# Patient Record
Sex: FEMALE | Race: WHITE | Employment: OTHER | URBAN - METROPOLITAN AREA
[De-identification: names, ages, dates, MRNs, and addresses within clinical notes are randomized per-mention and may not be internally consistent; named-entity substitution may affect disease eponyms.]

---

## 2018-10-04 PROBLEM — E55.9 HYPOVITAMINOSIS D: Status: ACTIVE | Noted: 2018-10-04

## 2021-12-07 ENCOUNTER — APPOINTMENT (OUTPATIENT)
Dept: GENERAL RADIOLOGY | Age: 86
DRG: 522 | End: 2021-12-07
Payer: COMMERCIAL

## 2021-12-07 ENCOUNTER — HOSPITAL ENCOUNTER (INPATIENT)
Age: 86
LOS: 6 days | Discharge: NURSING, REHAB OR LONG-TERM CARE | DRG: 522 | End: 2021-12-13
Attending: EMERGENCY MEDICINE | Admitting: SURGERY
Payer: COMMERCIAL

## 2021-12-07 DIAGNOSIS — S72.001A CLOSED FRACTURE OF NECK OF RIGHT FEMUR, INITIAL ENCOUNTER (HCC): Primary | ICD-10-CM
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
## 2021-12-07 PROBLEM — S72.011A CLOSED SUBCAPITAL FRACTURE OF FEMUR, RIGHT, INITIAL ENCOUNTER (HCC): Status: ACTIVE | Noted: 2021-12-07

## 2021-12-07 LAB
ABSOLUTE EOS #: 0.13 K/UL (ref 0–0.4)
ABSOLUTE IMMATURE GRANULOCYTE: 0 K/UL (ref 0–0.3)
ABSOLUTE LYMPH #: 0.58 K/UL (ref 1–4.8)
ABSOLUTE MONO #: 0.64 K/UL (ref 0.1–0.8)
ALBUMIN SERPL-MCNC: 3.8 G/DL (ref 3.5–5.2)
ALBUMIN/GLOBULIN RATIO: 0.9 (ref 1–2.5)
ALLEN TEST: ABNORMAL
ALP BLD-CCNC: 170 U/L (ref 35–104)
ALT SERPL-CCNC: 24 U/L (ref 5–33)
ANION GAP SERPL CALCULATED.3IONS-SCNC: 17 MMOL/L (ref 9–17)
ANION GAP SERPL CALCULATED.3IONS-SCNC: ABNORMAL MMOL/L
AST SERPL-CCNC: 65 U/L
BASOPHILS # BLD: 1 % (ref 0–2)
BASOPHILS ABSOLUTE: 0.06 K/UL (ref 0–0.2)
BILIRUB SERPL-MCNC: 1.15 MG/DL (ref 0.3–1.2)
BILIRUBIN DIRECT: 0.4 MG/DL
BILIRUBIN, INDIRECT: 0.75 MG/DL (ref 0–1)
BLOOD BANK SPECIMEN: ABNORMAL
BUN BLDV-MCNC: 15 MG/DL (ref 8–23)
BUN BLDV-MCNC: ABNORMAL MG/DL
BUN/CREAT BLD: ABNORMAL (ref 9–20)
CALCIUM SERPL-MCNC: 9.7 MG/DL (ref 8.6–10.4)
CARBOXYHEMOGLOBIN: 2.1 % (ref 0–5)
CHLORIDE BLD-SCNC: 99 MMOL/L (ref 98–107)
CHLORIDE BLD-SCNC: ABNORMAL MMOL/L
CO2: 19 MMOL/L (ref 20–31)
CO2: ABNORMAL MMOL/L
CREAT SERPL-MCNC: 0.66 MG/DL (ref 0.5–0.9)
CREAT SERPL-MCNC: ABNORMAL MG/DL
DIFFERENTIAL TYPE: ABNORMAL
EOSINOPHILS RELATIVE PERCENT: 2 % (ref 1–4)
ETHANOL PERCENT: <0.01 %
ETHANOL: <10 MG/DL
FIO2: ABNORMAL
GFR AFRICAN AMERICAN: >60 ML/MIN
GFR AFRICAN AMERICAN: ABNORMAL ML/MIN
GFR NON-AFRICAN AMERICAN: >60 ML/MIN
GFR NON-AFRICAN AMERICAN: ABNORMAL ML/MIN
GFR SERPL CREATININE-BSD FRML MDRD: ABNORMAL ML/MIN/{1.73_M2}
GLOBULIN: ABNORMAL G/DL (ref 1.5–3.8)
GLUCOSE BLD-MCNC: 231 MG/DL (ref 70–99)
GLUCOSE BLD-MCNC: ABNORMAL MG/DL
HCG QUALITATIVE: NEGATIVE
HCO3 VENOUS: 22.1 MMOL/L (ref 24–30)
HCT VFR BLD CALC: 38.6 % (ref 36.3–47.1)
HCT VFR BLD CALC: ABNORMAL %
HEMOGLOBIN: 13.1 G/DL (ref 11.9–15.1)
HEMOGLOBIN: ABNORMAL G/DL
IMMATURE GRANULOCYTES: 0 %
INR BLD: 1.1
LYMPHOCYTES # BLD: 9 % (ref 24–44)
MCH RBC QN AUTO: 33.3 PG (ref 25.2–33.5)
MCH RBC QN AUTO: ABNORMAL PG
MCHC RBC AUTO-ENTMCNC: 33.9 G/DL (ref 28.4–34.8)
MCHC RBC AUTO-ENTMCNC: ABNORMAL G/DL
MCV RBC AUTO: 98.2 FL (ref 82.6–102.9)
MCV RBC AUTO: ABNORMAL FL
METHEMOGLOBIN: ABNORMAL % (ref 0–1.5)
MODE: ABNORMAL
MONOCYTES # BLD: 10 % (ref 1–7)
MORPHOLOGY: NORMAL
NEGATIVE BASE EXCESS, VEN: 2.4 MMOL/L (ref 0–2)
NOTIFICATION TIME: ABNORMAL
NOTIFICATION: ABNORMAL
NRBC AUTOMATED: 0 PER 100 WBC
NRBC AUTOMATED: ABNORMAL PER 100 WBC
O2 DEVICE/FLOW/%: ABNORMAL
O2 SAT, VEN: 98.4 % (ref 60–85)
OXYHEMOGLOBIN: ABNORMAL % (ref 95–98)
PARTIAL THROMBOPLASTIN TIME: 27.1 SEC (ref 20.5–30.5)
PATIENT TEMP: 37
PCO2, VEN, TEMP ADJ: ABNORMAL MMHG (ref 39–55)
PCO2, VEN: 39.5 (ref 39–55)
PDW BLD-RTO: 13.9 % (ref 11.8–14.4)
PDW BLD-RTO: ABNORMAL %
PEEP/CPAP: ABNORMAL
PH VENOUS: 7.37 (ref 7.32–7.42)
PH, VEN, TEMP ADJ: ABNORMAL (ref 7.32–7.42)
PLATELET # BLD: 191 K/UL (ref 138–453)
PLATELET # BLD: ABNORMAL K/UL
PLATELET ESTIMATE: ABNORMAL
PMV BLD AUTO: 10.7 FL (ref 8.1–13.5)
PMV BLD AUTO: ABNORMAL FL
PO2, VEN, TEMP ADJ: ABNORMAL MMHG (ref 30–50)
PO2, VEN: 138 (ref 30–50)
POSITIVE BASE EXCESS, VEN: ABNORMAL MMOL/L (ref 0–2)
POTASSIUM SERPL-SCNC: 3.7 MMOL/L (ref 3.7–5.3)
POTASSIUM SERPL-SCNC: ABNORMAL MMOL/L
PROTHROMBIN TIME: 11.7 SEC (ref 9.1–12.3)
PSV: ABNORMAL
PT. POSITION: ABNORMAL
RBC # BLD: 3.93 M/UL (ref 3.95–5.11)
RBC # BLD: ABNORMAL 10*6/UL
RBC # BLD: ABNORMAL M/UL
RESPIRATORY RATE: ABNORMAL
SAMPLE SITE: ABNORMAL
SARS-COV-2, RAPID: NOT DETECTED
SEG NEUTROPHILS: 78 % (ref 36–66)
SEGMENTED NEUTROPHILS ABSOLUTE COUNT: 4.99 K/UL (ref 1.8–7.7)
SET RATE: ABNORMAL
SODIUM BLD-SCNC: 135 MMOL/L (ref 135–144)
SODIUM BLD-SCNC: ABNORMAL MMOL/L
SPECIMEN DESCRIPTION: NORMAL
TEXT FOR RESPIRATORY: ABNORMAL
TOTAL HB: ABNORMAL G/DL (ref 12–16)
TOTAL PROTEIN: 8.2 G/DL (ref 6.4–8.3)
TOTAL RATE: ABNORMAL
VT: ABNORMAL
WBC # BLD: 6.4 K/UL (ref 3.5–11.3)
WBC # BLD: ABNORMAL 10*3/UL
WBC # BLD: ABNORMAL K/UL

## 2021-12-07 PROCEDURE — 80048 BASIC METABOLIC PNL TOTAL CA: CPT

## 2021-12-07 PROCEDURE — 84520 ASSAY OF UREA NITROGEN: CPT

## 2021-12-07 PROCEDURE — 85610 PROTHROMBIN TIME: CPT

## 2021-12-07 PROCEDURE — 96375 TX/PRO/DX INJ NEW DRUG ADDON: CPT

## 2021-12-07 PROCEDURE — 84100 ASSAY OF PHOSPHORUS: CPT

## 2021-12-07 PROCEDURE — 87635 SARS-COV-2 COVID-19 AMP PRB: CPT

## 2021-12-07 PROCEDURE — 85027 COMPLETE CBC AUTOMATED: CPT

## 2021-12-07 PROCEDURE — 93005 ELECTROCARDIOGRAM TRACING: CPT | Performed by: STUDENT IN AN ORGANIZED HEALTH CARE EDUCATION/TRAINING PROGRAM

## 2021-12-07 PROCEDURE — 73502 X-RAY EXAM HIP UNI 2-3 VIEWS: CPT

## 2021-12-07 PROCEDURE — 96374 THER/PROPH/DIAG INJ IV PUSH: CPT

## 2021-12-07 PROCEDURE — 93005 ELECTROCARDIOGRAM TRACING: CPT | Performed by: PEDIATRICS

## 2021-12-07 PROCEDURE — 73562 X-RAY EXAM OF KNEE 3: CPT

## 2021-12-07 PROCEDURE — G0480 DRUG TEST DEF 1-7 CLASSES: HCPCS

## 2021-12-07 PROCEDURE — 99285 EMERGENCY DEPT VISIT HI MDM: CPT

## 2021-12-07 PROCEDURE — 6360000002 HC RX W HCPCS: Performed by: PEDIATRICS

## 2021-12-07 PROCEDURE — 86850 RBC ANTIBODY SCREEN: CPT

## 2021-12-07 PROCEDURE — 82565 ASSAY OF CREATININE: CPT

## 2021-12-07 PROCEDURE — 85730 THROMBOPLASTIN TIME PARTIAL: CPT

## 2021-12-07 PROCEDURE — 84703 CHORIONIC GONADOTROPIN ASSAY: CPT

## 2021-12-07 PROCEDURE — 71045 X-RAY EXAM CHEST 1 VIEW: CPT

## 2021-12-07 PROCEDURE — 80076 HEPATIC FUNCTION PANEL: CPT

## 2021-12-07 PROCEDURE — 83735 ASSAY OF MAGNESIUM: CPT

## 2021-12-07 PROCEDURE — 86920 COMPATIBILITY TEST SPIN: CPT

## 2021-12-07 PROCEDURE — 86900 BLOOD TYPING SEROLOGIC ABO: CPT

## 2021-12-07 PROCEDURE — 73552 X-RAY EXAM OF FEMUR 2/>: CPT

## 2021-12-07 PROCEDURE — 82805 BLOOD GASES W/O2 SATURATION: CPT

## 2021-12-07 PROCEDURE — 1200000000 HC SEMI PRIVATE

## 2021-12-07 PROCEDURE — 80051 ELECTROLYTE PANEL: CPT

## 2021-12-07 PROCEDURE — 82947 ASSAY GLUCOSE BLOOD QUANT: CPT

## 2021-12-07 PROCEDURE — 86901 BLOOD TYPING SEROLOGIC RH(D): CPT

## 2021-12-07 PROCEDURE — 99221 1ST HOSP IP/OBS SF/LOW 40: CPT | Performed by: ORTHOPAEDIC SURGERY

## 2021-12-07 PROCEDURE — 85025 COMPLETE CBC W/AUTO DIFF WBC: CPT

## 2021-12-07 RX ORDER — SODIUM CHLORIDE 9 MG/ML
25 INJECTION, SOLUTION INTRAVENOUS PRN
Status: DISCONTINUED | OUTPATIENT
Start: 2021-12-07 | End: 2021-12-08

## 2021-12-07 RX ORDER — ONDANSETRON 2 MG/ML
4 INJECTION INTRAMUSCULAR; INTRAVENOUS EVERY 6 HOURS PRN
Status: DISCONTINUED | OUTPATIENT
Start: 2021-12-07 | End: 2021-12-10

## 2021-12-07 RX ORDER — SODIUM PHOSPHATE, DIBASIC AND SODIUM PHOSPHATE, MONOBASIC 7; 19 G/133ML; G/133ML
1 ENEMA RECTAL DAILY PRN
Status: DISCONTINUED | OUTPATIENT
Start: 2021-12-07 | End: 2021-12-08

## 2021-12-07 RX ORDER — ACETAMINOPHEN 500 MG
1000 TABLET ORAL EVERY 8 HOURS SCHEDULED
Status: DISCONTINUED | OUTPATIENT
Start: 2021-12-07 | End: 2021-12-13 | Stop reason: HOSPADM

## 2021-12-07 RX ORDER — SODIUM CHLORIDE 0.9 % (FLUSH) 0.9 %
5-40 SYRINGE (ML) INJECTION EVERY 12 HOURS SCHEDULED
Status: DISCONTINUED | OUTPATIENT
Start: 2021-12-07 | End: 2021-12-08

## 2021-12-07 RX ORDER — METHOCARBAMOL 500 MG/1
750 TABLET, FILM COATED ORAL EVERY 6 HOURS
Status: DISCONTINUED | OUTPATIENT
Start: 2021-12-07 | End: 2021-12-09

## 2021-12-07 RX ORDER — FAMOTIDINE 20 MG/1
20 TABLET, FILM COATED ORAL DAILY
Status: DISCONTINUED | OUTPATIENT
Start: 2021-12-08 | End: 2021-12-08

## 2021-12-07 RX ORDER — MORPHINE SULFATE 4 MG/ML
4 INJECTION, SOLUTION INTRAMUSCULAR; INTRAVENOUS
Status: DISCONTINUED | OUTPATIENT
Start: 2021-12-07 | End: 2021-12-08

## 2021-12-07 RX ORDER — DEXTROSE MONOHYDRATE 25 G/50ML
12.5 INJECTION, SOLUTION INTRAVENOUS PRN
Status: DISCONTINUED | OUTPATIENT
Start: 2021-12-07 | End: 2021-12-10

## 2021-12-07 RX ORDER — SODIUM CHLORIDE 0.9 % (FLUSH) 0.9 %
5-40 SYRINGE (ML) INJECTION PRN
Status: DISCONTINUED | OUTPATIENT
Start: 2021-12-07 | End: 2021-12-13 | Stop reason: HOSPADM

## 2021-12-07 RX ORDER — POLYETHYLENE GLYCOL 3350 17 G/17G
17 POWDER, FOR SOLUTION ORAL DAILY
Status: DISCONTINUED | OUTPATIENT
Start: 2021-12-08 | End: 2021-12-07 | Stop reason: SDUPTHER

## 2021-12-07 RX ORDER — AMLODIPINE BESYLATE 10 MG/1
10 TABLET ORAL DAILY
Status: DISCONTINUED | OUTPATIENT
Start: 2021-12-08 | End: 2021-12-13 | Stop reason: HOSPADM

## 2021-12-07 RX ORDER — NICOTINE POLACRILEX 4 MG
15 LOZENGE BUCCAL PRN
Status: DISCONTINUED | OUTPATIENT
Start: 2021-12-07 | End: 2021-12-08

## 2021-12-07 RX ORDER — ONDANSETRON 4 MG/1
4 TABLET, ORALLY DISINTEGRATING ORAL EVERY 8 HOURS PRN
Status: DISCONTINUED | OUTPATIENT
Start: 2021-12-07 | End: 2021-12-10

## 2021-12-07 RX ORDER — METOPROLOL TARTRATE 50 MG/1
50 TABLET, FILM COATED ORAL DAILY
Status: DISCONTINUED | OUTPATIENT
Start: 2021-12-08 | End: 2021-12-13 | Stop reason: HOSPADM

## 2021-12-07 RX ORDER — DEXTROSE, SODIUM CHLORIDE, AND POTASSIUM CHLORIDE 5; .45; .15 G/100ML; G/100ML; G/100ML
INJECTION INTRAVENOUS CONTINUOUS
Status: DISPENSED | OUTPATIENT
Start: 2021-12-08 | End: 2021-12-09

## 2021-12-07 RX ORDER — BISACODYL 10 MG
10 SUPPOSITORY, RECTAL RECTAL DAILY
Status: DISCONTINUED | OUTPATIENT
Start: 2021-12-08 | End: 2021-12-12

## 2021-12-07 RX ORDER — DEXTROSE MONOHYDRATE 50 MG/ML
100 INJECTION, SOLUTION INTRAVENOUS PRN
Status: DISCONTINUED | OUTPATIENT
Start: 2021-12-07 | End: 2021-12-10

## 2021-12-07 RX ORDER — SENNA AND DOCUSATE SODIUM 50; 8.6 MG/1; MG/1
1 TABLET, FILM COATED ORAL 2 TIMES DAILY
Status: DISCONTINUED | OUTPATIENT
Start: 2021-12-07 | End: 2021-12-13 | Stop reason: HOSPADM

## 2021-12-07 RX ORDER — GABAPENTIN 300 MG/1
300 CAPSULE ORAL EVERY 8 HOURS
Status: DISCONTINUED | OUTPATIENT
Start: 2021-12-07 | End: 2021-12-10

## 2021-12-07 RX ORDER — HYDROCHLOROTHIAZIDE 25 MG/1
25 TABLET ORAL DAILY
Status: DISCONTINUED | OUTPATIENT
Start: 2021-12-08 | End: 2021-12-13 | Stop reason: HOSPADM

## 2021-12-07 RX ORDER — OXYCODONE HYDROCHLORIDE 5 MG/1
2.5 TABLET ORAL EVERY 6 HOURS PRN
Status: DISCONTINUED | OUTPATIENT
Start: 2021-12-07 | End: 2021-12-10

## 2021-12-07 RX ORDER — FENTANYL CITRATE 50 UG/ML
50 INJECTION, SOLUTION INTRAMUSCULAR; INTRAVENOUS
Status: DISCONTINUED | OUTPATIENT
Start: 2021-12-07 | End: 2021-12-08

## 2021-12-07 RX ORDER — SODIUM CHLORIDE 0.9 % (FLUSH) 0.9 %
5-40 SYRINGE (ML) INJECTION PRN
Status: DISCONTINUED | OUTPATIENT
Start: 2021-12-07 | End: 2021-12-10

## 2021-12-07 RX ORDER — ONDANSETRON 4 MG/1
4 TABLET, ORALLY DISINTEGRATING ORAL EVERY 8 HOURS PRN
Status: DISCONTINUED | OUTPATIENT
Start: 2021-12-07 | End: 2021-12-07 | Stop reason: SDUPTHER

## 2021-12-07 RX ORDER — MORPHINE SULFATE 4 MG/ML
4 INJECTION, SOLUTION INTRAMUSCULAR; INTRAVENOUS ONCE
Status: COMPLETED | OUTPATIENT
Start: 2021-12-07 | End: 2021-12-07

## 2021-12-07 RX ORDER — ONDANSETRON 2 MG/ML
4 INJECTION INTRAMUSCULAR; INTRAVENOUS EVERY 6 HOURS PRN
Status: DISCONTINUED | OUTPATIENT
Start: 2021-12-07 | End: 2021-12-07 | Stop reason: SDUPTHER

## 2021-12-07 RX ORDER — POLYETHYLENE GLYCOL 3350 17 G/17G
17 POWDER, FOR SOLUTION ORAL DAILY
Status: DISCONTINUED | OUTPATIENT
Start: 2021-12-08 | End: 2021-12-13 | Stop reason: HOSPADM

## 2021-12-07 RX ADMIN — MORPHINE SULFATE 4 MG: 4 INJECTION INTRAVENOUS at 16:34

## 2021-12-07 RX ADMIN — FENTANYL CITRATE 50 MCG: 50 INJECTION, SOLUTION INTRAMUSCULAR; INTRAVENOUS at 22:28

## 2021-12-07 RX ADMIN — FENTANYL CITRATE 50 MCG: 50 INJECTION, SOLUTION INTRAMUSCULAR; INTRAVENOUS at 20:32

## 2021-12-07 RX ADMIN — FENTANYL CITRATE 50 MCG: 50 INJECTION, SOLUTION INTRAMUSCULAR; INTRAVENOUS at 16:06

## 2021-12-07 ASSESSMENT — PAIN SCALES - GENERAL
PAINLEVEL_OUTOF10: 10
PAINLEVEL_OUTOF10: 8
PAINLEVEL_OUTOF10: 10
PAINLEVEL_OUTOF10: 7
PAINLEVEL_OUTOF10: 10

## 2021-12-07 ASSESSMENT — ENCOUNTER SYMPTOMS
SORE THROAT: 0
EYE DISCHARGE: 0
CHEST TIGHTNESS: 0
EYE REDNESS: 0
WHEEZING: 0
EYES NEGATIVE: 1
COUGH: 0
SHORTNESS OF BREATH: 0
VOMITING: 0
ABDOMINAL PAIN: 0
CHOKING: 0
CONSTIPATION: 0
DIARRHEA: 0
RESPIRATORY NEGATIVE: 1
ABDOMINAL DISTENTION: 0
RHINORRHEA: 0

## 2021-12-07 ASSESSMENT — PAIN DESCRIPTION - PAIN TYPE: TYPE: ACUTE PAIN

## 2021-12-07 ASSESSMENT — PAIN DESCRIPTION - LOCATION: LOCATION: HIP

## 2021-12-07 ASSESSMENT — PAIN DESCRIPTION - ORIENTATION: ORIENTATION: RIGHT

## 2021-12-07 NOTE — ED PROVIDER NOTES
[address] [facility]     Emergency Department     Faculty Attestation    I performed a history and physical examination of the patient and discussed management with the resident. I reviewed the residents note and agree with the documented findings including all diagnostic interpretations and plan of care. Any areas of disagreement are noted on the chart. I was personally present for the key portions of any procedures. I have documented in the chart those procedures where I was not present during the key portions. I have reviewed the emergency nurses triage note. I agree with the chief complaint, past medical history, past surgical history, allergies, medications, social and family history as documented unless otherwise noted below. Documentation of the HPI, Physical Exam and Medical Decision Making performed by scribes is based on my personal performance of the HPI, PE and MDM. For Physician Assistant/ Nurse Practitioner cases/documentation I have personally evaluated this patient and have completed at least one if not all key elements of the E/M (history, physical exam, and MDM). Additional findings are as noted. This patient was evaluated in the Emergency Department for symptoms described in the history of present illness. He/she was evaluated in the context of the global COVID-19 pandemic, which necessitated consideration that the patient might be at risk for infection with the SARS-CoV-2 virus that causes COVID-19. Institutional protocols and algorithms that pertain to the evaluation of patients at risk for COVID-19 are in a state of rapid change based on information released by regulatory bodies including the CDC and federal and state organizations. These policies and algorithms were followed during the patient's care in the ED. Primary Care Physician: Parrish Thrasher MD    History:  This is a 80 y.o. female who presents to the Emergency Department with

## 2021-12-07 NOTE — ED NOTES
Bed: 19  Expected date: 12/7/21  Expected time: 3:35 PM  Means of arrival: EMS  Comments:  Lawson Fong RN  12/07/21 [id number]

## 2021-12-07 NOTE — ED NOTES
Report received from [facility]. Pt resting in bed. Family at bedside.  Will continue to monitor       Kati Jo RN  12/07/21 0542

## 2021-12-07 NOTE — ED PROVIDER NOTES
[facility]  Emergency Department Encounter  EmergencyMedicine Resident     Pt Paulette Vora  MRN: [id number]  Elena 11/9/1929  Date of evaluation: 12/7/21  PCP:  Parrish Thrasher MD    CHIEF COMPLAINT       Chief Complaint   Patient presents with    Fall     possible hip fx        HISTORY OF PRESENT ILLNESS  (Location/Symptom, Timing/Onset, Context/Setting, Quality, Duration, Modifying Factors, Severity.)      Shana Lopez is a 80 y.o. female otherwise healthy, has no history of hypertension and prediabetes who lives in assisted living center and cares for herself primarily, who presents via squad after fall. Patient states she has a cane to ambulate and was on linoleum tile floor bent down to pick something up and her cane slipped forward and she fell from a squatted position on her left hip. Patient states she was laying there approximately 30 minutes before pushing the button for help unable to get herself up. No anticoagulation. Alert and oriented x4 denies chest pain shortness of breath nausea vomiting abdominal pain or diarrhea. She states that she has extreme pain in her right hip only, and it is reproduced with any mobility of her foot toes or knee. Was provided 100 MCG fentanyl for squad, arrived with a 20-gauge IV in right AC. Medication list from assisted living and code status as well as POA reviewed. No family at bedside at this time. Patient is a DNR-CCA. Patient endorses having a blood transfusion within the past 6 months and on a medication for anemia. Patient is on an ARB, CCB and beta-blocker for hypertension, she is on Metformin for diabetes. Activities of daily living, patient does drive herself to grocery store and cook herself food.      PAST MEDICAL / SURGICAL / SOCIAL / FAMILY HISTORY      has a past medical history of Anxiety, Cancer ([address] Chronic kidney disease, DM (diabetes mellitus) ([facility] [address] GI bleed, History of blood transfusion, Hypertension, Liver disease, and Osteoarthritis. has a past surgical history that includes Breast lumpectomy; lymph node dissection; Colonoscopy; Endoscopy, colon, diagnostic; hip surgery (Right, 12/08/2021); and hip surgery (Right, 12/8/2021). Social History     Socioeconomic History    Marital status:      Spouse name: Not on file    Number of children: Not on file    Years of education: Not on file    Highest education level: Not on file   Occupational History    Not on file   Tobacco Use    Smoking status: Never Smoker    Smokeless tobacco: Never Used   Substance and Sexual Activity    Alcohol use: No     Alcohol/week: 0.0 standard drinks    Drug use: No    Sexual activity: Not on file   Other Topics Concern    Not on file   Social History Narrative    Not on file     Social Determinants of Health     Financial Resource Strain:     Difficulty of Paying Living Expenses: Not on file   Food Insecurity:     Worried About Running Out of Food in the Last Year: Not on file    Joi of Food in the Last Year: Not on file   Transportation Needs:     Lack of Transportation (Medical): Not on file    Lack of Transportation (Non-Medical):  Not on file   Physical Activity:     Days of Exercise per Week: Not on file    Minutes of Exercise per Session: Not on file   Stress:     Feeling of Stress : Not on file   Social Connections:     Frequency of Communication with Friends and Family: Not on file    Frequency of Social Gatherings with Friends and Family: Not on file    Attends Yarsani Services: Not on file    Active Member of Clubs or Organizations: Not on file    Attends Club or Organization Meetings: Not on file    Marital Status: Not on file   Intimate Partner Violence:     Fear of Current or Ex-Partner: Not on file    Emotionally Abused: Not on file    Physically Abused: Not on file    Sexually Abused: Not on file   Housing Stability:     Unable to Pay for Housing in the Last Year: Not on file    Number of Places Lived in the Last Year: Not on file    Unstable Housing in the Last Year: Not on file       Family History   Family history unknown: Yes       Allergies:  Latex, Atorvastatin, Keflex [cephalexin], Penicillins, and Rosuvastatin    Home Medications:  Prior to Admission medications    Medication Sig Start Date End Date Taking? Authorizing Provider   Cholecalciferol 50 MCG (2000 UT) TABS Take 50 mcg by mouth 2 times daily   Yes Historical Provider, MD   ferrous sulfate (FE TABS 325) 325 (65 Fe) MG EC tablet Take 325 mg by mouth 2 times daily 5/27/21  Yes Historical Provider, MD   levothyroxine (SYNTHROID) 25 MCG tablet Take 25 mcg by mouth daily 7/1/21  Yes Historical Provider, MD   loperamide (IMODIUM A-D) 2 MG tablet Take 2 mg by mouth 4 times daily as needed 9/9/21  Yes Historical Provider, MD   pantoprazole (PROTONIX) 40 MG tablet Take 40 mg by mouth daily   Yes Historical Provider, MD   potassium chloride (KLOR-CON M) 10 MEQ extended release tablet Take 10 mEq by mouth daily 9/9/21  Yes Historical Provider, MD   propranolol (INDERAL) 10 MG tablet Take 10 mg by mouth 2 times daily 5/27/21  Yes Historical Provider, MD   valsartan (DIOVAN) 320 MG tablet Take 320 mg by mouth daily 10/7/21  Yes Historical Provider, MD   acetaminophen-codeine (TYLENOL #3) 300-30 MG per tablet Take 1 tablet by mouth every 4 hours as needed for Pain   Yes Historical Provider, MD   amLODIPine (NORVASC) 10 MG tablet Take 10 mg by mouth daily   Yes Historical Provider, MD   hydrochlorothiazide (HYDRODIURIL) 25 MG tablet Take 25 mg by mouth daily   Yes Historical Provider, MD   metFORMIN (GLUCOPHAGE) 500 MG tablet Take 500 mg by mouth 2 times daily (with meals)   Yes Historical Provider, MD   ALPRAZolam (XANAX) 0.5 MG tablet Take 0.5 mg by mouth nightly as needed for Sleep.      Historical Provider, MD   metoprolol (LOPRESSOR) 50 MG tablet Take 50 mg by mouth daily    Historical Provider, MD REVIEW OF SYSTEMS    (2-9 systems for level 4, 10 or more for level 5)      Review of Systems   Constitutional: Negative for activity change, appetite change, chills and fever. HENT: Negative for congestion, ear pain, rhinorrhea and sore throat. Eyes: Negative for discharge and redness. Respiratory: Negative for cough, choking, chest tightness, shortness of breath and wheezing. Cardiovascular: Negative for chest pain and leg swelling. Gastrointestinal: Negative for constipation, diarrhea and vomiting. Endocrine: Negative for polydipsia and polyuria. Genitourinary: Negative for decreased urine volume, difficulty urinating, dysuria and menstrual problem. Musculoskeletal: Positive for gait problem. Right hip pain   Skin: Negative for rash. Allergic/Immunologic: Negative for food allergies. Neurological: Negative for dizziness, seizures, speech difficulty and headaches. Hematological: Negative for adenopathy. Does not bruise/bleed easily. No anticoagulation   Psychiatric/Behavioral: Negative for behavioral problems and confusion. PHYSICAL EXAM   (up to 7 for level 4, 8 or more for level 5)      INITIAL VITALS:   BP (!) 142/53   Pulse 67   Temp 98.6 °F (37 °C) (Oral)   Resp 20   Ht 5' 4\" (1.626 m)   Wt 186 lb (84.4 kg)   SpO2 94%   BMI 31.93 kg/m²     Physical Exam  Vitals and nursing note reviewed. Exam conducted with a chaperone present. Constitutional:       General: She is not in acute distress. Appearance: Normal appearance. She is well-developed and normal weight. She is not ill-appearing, toxic-appearing or diaphoretic. Comments: BP (!) 164/69   Pulse 77   Temp 97.4 °F (36.3 °C) (Axillary)   Resp 16   Ht 5' 4\" (1.626 m)   Wt 151 lb (68.5 kg)   SpO2 95%   BMI 25.92 kg/m²      HENT:      Head: Normocephalic and atraumatic. Right Ear: Tympanic membrane and external ear normal. There is no impacted cerumen.       Left Ear: Tympanic membrane and external ear normal. There is no impacted cerumen. Nose: Nose normal. No congestion. Mouth/Throat:      Mouth: Mucous membranes are dry. Pharynx: No oropharyngeal exudate. Eyes:      General: No scleral icterus. Right eye: No discharge. Left eye: No discharge. Conjunctiva/sclera: Conjunctivae normal.      Pupils: Pupils are equal, round, and reactive to light. Cardiovascular:      Rate and Rhythm: Normal rate and regular rhythm. Pulses: Normal pulses. Heart sounds: Normal heart sounds. Pulmonary:      Effort: Pulmonary effort is normal. No respiratory distress. Breath sounds: Normal breath sounds. No wheezing. Abdominal:      General: Abdomen is flat. Tenderness: There is no abdominal tenderness. There is no guarding. Musculoskeletal:         General: No swelling. Normal range of motion. Cervical back: Normal range of motion and neck supple. No rigidity or tenderness. Right lower leg: No edema. Left lower leg: No edema. Comments: Exquisitely tender to palpation of the right hip. She has an externally rotated right lower extremity. Some lateral medial palpation of of right knee with reproduction of pain. Her right knee is slightly more swollen than her left. Patient able to wiggle toes of her right lower extremity. Her right lower extremity is externally rotated and slightly shortened compared to her left, best visible at her heels. Reproduction of hip pain with any movement of right lower extremity. She does have a ecchymotic site on the dorsal aspect of her right foot, consistent with patient dropping an  item on her foot a few days prior to arrival.   Lymphadenopathy:      Cervical: No cervical adenopathy. Skin:     General: Skin is warm. Capillary Refill: Capillary refill takes less than 2 seconds. Coloration: Skin is not jaundiced or pale. Findings: Bruising present. No lesion or rash. Neurological:      General: No focal deficit present. Mental Status: She is alert and oriented to person, place, and time. Mental status is at baseline. Motor: No weakness. Comments: Neurovascularly intact patient is with normal sensation and able to follow commands    Psychiatric:         Mood and Affect: Mood normal.         DIFFERENTIAL  DIAGNOSIS     PLAN (LABS / IMAGING / EKG):  Orders Placed This Encounter   Procedures    COVID-19, Rapid    XR HIP RIGHT (2-3 VIEWS)    XR KNEE RIGHT (3 VIEWS)    XR FEMUR RIGHT (MIN 2 VIEWS)    XR CHEST PORTABLE    FLUORO FOR SURGICAL PROCEDURES    XR HIP 2-3 VW W PELVIS RIGHT    TRAUMA PANEL    CBC WITH AUTO DIFFERENTIAL    Basic Metabolic Panel w/ Reflex to MG    HEPATIC FUNCTION PANEL    Basic Metabolic Panel w/ Reflex to MG    Magnesium    Phosphorus    AMMONIA    Hemoglobin A1c    APTT    Protime-INR    Magnesium    Phosphorus    Vitamin D 25 Hydroxy    HEMOGLOBIN AND HEMATOCRIT, BLOOD    Basic Metabolic Panel w/ Reflex to MG    CBC WITH AUTO DIFFERENTIAL    Basic Metabolic Panel w/ Reflex to MG    SODIUM    ADULT DIET; Regular; 4 carb choices (60 gm/meal);  High Fiber; 1000 ml    Vital signs per unit routine    Daily weights    Intake and output    Monitor for signs/symptoms of urinary retention    Encourage deep breathing and coughing every two hours while awake    Vital signs per unit routine    Bedrest - Elevate Head of Bed    Notify Provider    Full Weight Bearing    Straight cath    Nursing communication    DNR comfort care - arrest    Inpatient consult to Orthopedic Surgery    ADULT ORAL NUTRITION SUPPLEMENT; Breakfast, Lunch, Dinner; Diabetic Oral Supplement    OT eval and treat    PT evaluation and treat    Initiate Oxygen Therapy Protocol    Incentive spirometry    Respiratory Care Evaluation and Treat    POC Glucose Fingerstick    POC Glucose Fingerstick    POC Glucose Fingerstick    POC Glucose Fingerstick    POC Glucose Fingerstick    POC Glucose Fingerstick    POC Glucose Fingerstick    POC Glucose Fingerstick    POC Glucose Fingerstick    POC Glucose Fingerstick    POC Glucose Fingerstick    POC Glucose Fingerstick    POCT glucose    POC Glucose Fingerstick    POC Glucose Fingerstick    POC Glucose Fingerstick    POC Glucose Fingerstick    EKG 12 Lead    EKG 12 Lead    Type and Screen    PREPARE RBC (CROSSMATCH), 2 Units    PATIENT STATUS (FROM ED OR OR/PROCEDURAL) Inpatient    Transfer Patient    Fall precautions       MEDICATIONS ORDERED:  Orders Placed This Encounter   Medications    DISCONTD: fentaNYL (SUBLIMAZE) injection 50 mcg    morphine injection 4 mg    DISCONTD: morphine injection 4 mg    amLODIPine (NORVASC) tablet 10 mg    hydroCHLOROthiazide (HYDRODIURIL) tablet 25 mg    metoprolol tartrate (LOPRESSOR) tablet 50 mg    DISCONTD: sodium chloride flush 0.9 % injection 5-40 mL    DISCONTD: sodium chloride flush 0.9 % injection 5-40 mL    DISCONTD: 0.9 % sodium chloride infusion    DISCONTD: ondansetron (ZOFRAN-ODT) disintegrating tablet 4 mg    DISCONTD: ondansetron (ZOFRAN) injection 4 mg    polyethylene glycol (GLYCOLAX) packet 17 g    bisacodyl (DULCOLAX) suppository 10 mg    DISCONTD: fleet rectal enema 1 enema    DISCONTD: sodium chloride flush 0.9 % injection 5-40 mL    sodium chloride flush 0.9 % injection 5-40 mL    DISCONTD: 0.9 % sodium chloride infusion    DISCONTD: ondansetron (ZOFRAN-ODT) disintegrating tablet 4 mg    DISCONTD: ondansetron (ZOFRAN) injection 4 mg    DISCONTD: polyethylene glycol (GLYCOLAX) packet 17 g    dextrose 5 % and 0.45 % NaCl with KCl 20 mEq infusion    acetaminophen (TYLENOL) tablet 1,000 mg    DISCONTD: oxyCODONE (ROXICODONE) immediate release tablet 2.5 mg    DISCONTD: methocarbamol (ROBAXIN) tablet 750 mg    DISCONTD: gabapentin (NEURONTIN) capsule 300 mg    sennosides-docusate sodium (SENOKOT-S) 8.6-50 MG tablet 1 tablet    DISCONTD: famotidine (PEPCID) tablet 20 mg    DISCONTD: glucose (GLUTOSE) 40 % oral gel 15 g    DISCONTD: dextrose 50 % IV solution    DISCONTD: glucagon (rDNA) injection 1 mg    DISCONTD: dextrose 5 % solution    DISCONTD: insulin lispro (HUMALOG) injection vial 0-18 Units    ceFAZolin (ANCEF) 2000 mg in dextrose 5 % 50 mL IVPB     Order Specific Question:   Antimicrobial Indications     Answer:   Surgical Prophylaxis    DISCONTD: 0.9 % sodium chloride infusion    DISCONTD: potassium chloride (KLOR-CON M) extended release tablet 40 mEq    DISCONTD: potassium chloride 10 mEq/100 mL IVPB (Peripheral Line)    levothyroxine (SYNTHROID) tablet 25 mcg    pantoprazole (PROTONIX) tablet 40 mg    propranolol (INDERAL) tablet 10 mg    HYDROmorphone (DILAUDID) injection 0.25 mg    DISCONTD: HYDROmorphone (DILAUDID) injection 0.5 mg    fentaNYL (SUBLIMAZE) injection 50 mcg    sodium chloride 0.9 % irrigation    DISCONTD: sterile water for irrigation    ceFAZolin (ANCEF) 2000 mg in dextrose 5 % 50 mL IVPB     Order Specific Question:   Antimicrobial Indications     Answer:   Surgical Prophylaxis    potassium chloride (KLOR-CON M) extended release tablet 40 mEq    DISCONTD: fentaNYL (SUBLIMAZE) injection 50 mcg    dextrose 5 % and 0.45 % nacl bolus    methocarbamol (ROBAXIN) tablet 750 mg    enoxaparin (LOVENOX) injection 30 mg    DISCONTD: magnesium sulfate 3,000 mg in dextrose 5 % 100 mL IVPB    magnesium sulfate 2000 mg in 50 mL IVPB premix    DISCONTD: insulin lispro (HUMALOG) injection vial 0-12 Units    DISCONTD: insulin lispro (HUMALOG) injection vial 0-6 Units    magnesium sulfate 4000 mg in 100 mL IVPB premix    gabapentin (NEURONTIN) capsule 200 mg    0.9 % sodium chloride infusion    potassium chloride (KLOR-CON M) extended release tablet 40 mEq    ALPRAZolam (XANAX) tablet 0.5 mg    valsartan (DIOVAN) tablet 320 mg    potassium chloride (MICRO-K) extended release capsule 10 mEq       DDX: right hip frature, right femur fracture, right knee fracture, dislocated right hip, cardiac event unlikley given patient is a good historian denying chest pain or SOB, recalling entire event, and ekg reassuring. DIAGNOSTIC RESULTS / EMERGENCY DEPARTMENT COURSE / MDM   LAB RESULTS:  Results for orders placed or performed during the hospital encounter of 12/07/21   COVID-19, Rapid    Specimen: Nasopharyngeal Swab   Result Value Ref Range    Specimen Description . NASOPHARYNGEAL SWAB     SARS-CoV-2, Rapid Not Detected Not Detected   TRAUMA PANEL   Result Value Ref Range    Ethanol <10 <10 mg/dL    Ethanol percent <0.010 <0.010 %    Blood Bank Specimen NO SAMPLE RECEIVED     BUN DUPLICATE ORDER mg/dL    WBC DUPLICATE ORDER k/uL    RBC DUPLICATE ORDER m/uL    Hemoglobin DUPLICATE ORDER g/dL    Hematocrit DUPLICATE ORDER %    MCV DUPLICATE ORDER fL    MCH DUPLICATE ORDER pg    MCHC DUPLICATE ORDER g/dL    RDW DUPLICATE ORDER %    Platelets DUPLICATE ORDER k/uL    MPV DUPLICATE ORDER fL    NRBC Automated DUPLICATE ORDER per 911 WBC    CREATININE DUPLICATE ORDER mg/dL    GFR Non- DUPLICATE ORDER >56 mL/min    GFR  DUPLICATE ORDER >66 mL/min    GFR Comment DUPLICATE ORDER     GFR Staging DUPLICATE ORDER     Glucose DUPLICATE ORDER mg/dL    hCG Qual NEGATIVE NEGATIVE    Sodium DUPLICATE ORDER mmol/L    Potassium DUPLICATE ORDER mmol/L    Chloride DUPLICATE ORDER mmol/L    CO2 DUPLICATE ORDER mmol/L    Anion Gap DUPLICATE ORDER mmol/L    Protime 11.7 9.1 - 12.3 sec    INR 1.1     PTT 27.1 20.5 - 30.5 sec    pH, Jarrett 7.367 7.320 - 7.420    pCO2, Jarrett 39.5 39 - 55    pO2, Jarrett 138.0 (H) 30 - 50    HCO3, Venous 22.1 (L) 24 - 30 mmol/L    Positive Base Excess, Jarrett NOT REPORTED 0.0 - 2.0 mmol/L    Negative Base Excess, Jarrett 2.4 (H) 0.0 - 2.0 mmol/L    O2 Sat, Jarrett 98.4 (H) 60.0 - 85.0 %    Total Hb NOT REPORTED 12.0 - 16.0 g/dl    Oxyhemoglobin NOT REPORTED 95.0 - 98.0 % Carboxyhemoglobin 2.1 0 - 5 %    Methemoglobin NOT REPORTED 0.0 - 1.5 %    Pt Temp 37.0     pH, Jarrett, Temp Adj NOT REPORTED 7.320 - 7.420    pCO2, Jarrett, Temp Adj NOT REPORTED 39 - 55 mmHg    pO2, Jarrett, Temp Adj NOT REPORTED 30 - 50 mmHg    O2 Device/Flow/% NOT REPORTED     Respiratory Rate NOT REPORTED     Ar Test NOT REPORTED     Sample Site NOT REPORTED     Pt.  Position NOT REPORTED     Mode NOT REPORTED     Set Rate NOT REPORTED     Total Rate NOT REPORTED     VT NOT REPORTED     FIO2 INFORMATION NOT PROVIDED     Peep/Cpap NOT REPORTED     PSV NOT REPORTED     Text for Respiratory NOT REPORTED     NOTIFICATION NOT REPORTED     NOTIFICATION TIME NOT REPORTED    CBC WITH AUTO DIFFERENTIAL   Result Value Ref Range    WBC 6.4 3.5 - 11.3 k/uL    RBC 3.93 (L) 3.95 - 5.11 m/uL    Hemoglobin 13.1 11.9 - 15.1 g/dL    Hematocrit 38.6 36.3 - 47.1 %    MCV 98.2 82.6 - 102.9 fL    MCH 33.3 25.2 - 33.5 pg    MCHC 33.9 28.4 - 34.8 g/dL    RDW 13.9 11.8 - 14.4 %    Platelets 015 687 - 541 k/uL    MPV 10.7 8.1 - 13.5 fL    NRBC Automated 0.0 0.0 per 100 WBC    Differential Type NOT REPORTED     WBC Morphology NOT REPORTED     RBC Morphology NOT REPORTED     Platelet Estimate NOT REPORTED     Immature Granulocytes 0 0 %    Seg Neutrophils 78 (H) 36 - 66 %    Lymphocytes 9 (L) 24 - 44 %    Monocytes 10 (H) 1 - 7 %    Eosinophils % 2 1 - 4 %    Basophils 1 0 - 2 %    Absolute Immature Granulocyte 0.00 0.00 - 0.30 k/uL    Segs Absolute 4.99 1.8 - 7.7 k/uL    Absolute Lymph # 0.58 (L) 1.0 - 4.8 k/uL    Absolute Mono # 0.64 0.1 - 0.8 k/uL    Absolute Eos # 0.13 0.0 - 0.4 k/uL    Basophils Absolute 0.06 0.0 - 0.2 k/uL    Morphology Normal    Basic Metabolic Panel w/ Reflex to MG   Result Value Ref Range    Glucose 231 (H) 70 - 99 mg/dL    BUN 15 8 - 23 mg/dL    CREATININE 0.66 0.50 - 0.90 mg/dL    Bun/Cre Ratio NOT REPORTED 9 - 20    Calcium 9.7 8.6 - 10.4 mg/dL    Sodium 135 135 - 144 mmol/L    Potassium 3.7 3.7 - 5.3 mmol/L Chloride 99 98 - 107 mmol/L    CO2 19 (L) 20 - 31 mmol/L    Anion Gap 17 9 - 17 mmol/L    GFR Non-African American >60 >60 mL/min    GFR African American >60 >60 mL/min    GFR Comment          GFR Staging NOT REPORTED    HEPATIC FUNCTION PANEL   Result Value Ref Range    Albumin 3.8 3.5 - 5.2 g/dL    Alkaline Phosphatase 170 (H) 35 - 104 U/L    ALT 24 5 - 33 U/L    AST 65 (H) <32 U/L    Total Bilirubin 1.15 0.3 - 1.2 mg/dL    Bilirubin, Direct 0.40 (H) <0.31 mg/dL    Bilirubin, Indirect 0.75 0.00 - 1.00 mg/dL    Total Protein 8.2 6.4 - 8.3 g/dL    Globulin NOT REPORTED 1.5 - 3.8 g/dL    Albumin/Globulin Ratio 0.9 (L) 1.0 - 2.5   APTT   Result Value Ref Range    PTT 25.8 20.5 - 30.5 sec   Protime-INR   Result Value Ref Range    Protime 12.5 (H) 9.1 - 12.3 sec    INR 1.2    Basic Metabolic Panel w/ Reflex to MG   Result Value Ref Range    Glucose 160 (H) 70 - 99 mg/dL    BUN 14 8 - 23 mg/dL    CREATININE 0.69 0.50 - 0.90 mg/dL    Bun/Cre Ratio NOT REPORTED 9 - 20    Calcium 9.6 8.6 - 10.4 mg/dL    Sodium 135 135 - 144 mmol/L    Potassium 3.1 (L) 3.7 - 5.3 mmol/L    Chloride 99 98 - 107 mmol/L    CO2 22 20 - 31 mmol/L    Anion Gap 14 9 - 17 mmol/L    GFR Non-African American >60 >60 mL/min    GFR African American >60 >60 mL/min    GFR Comment          GFR Staging NOT REPORTED    CBC auto differential   Result Value Ref Range    WBC 7.0 3.5 - 11.3 k/uL    RBC 3.71 (L) 3.95 - 5.11 m/uL    Hemoglobin 12.2 11.9 - 15.1 g/dL    Hematocrit 35.8 (L) 36.3 - 47.1 %    MCV 96.5 82.6 - 102.9 fL    MCH 32.9 25.2 - 33.5 pg    MCHC 34.1 28.4 - 34.8 g/dL    RDW 13.8 11.8 - 14.4 %    Platelets 599 197 - 402 k/uL    MPV 9.5 8.1 - 13.5 fL    NRBC Automated 0.0 0.0 per 100 WBC    Differential Type NOT REPORTED     Seg Neutrophils 77 (H) 36 - 65 %    Lymphocytes 11 (L) 24 - 43 %    Monocytes 9 3 - 12 %    Eosinophils % 2 1 - 4 %    Basophils 1 0 - 2 %    Immature Granulocytes 0 0 %    Segs Absolute 5.45 1.50 - 8.10 k/uL    Absolute Lymph # 0.79 (L) 1.10 - 3.70 k/uL    Absolute Mono # 0.61 0.10 - 1.20 k/uL    Absolute Eos # 0.11 0.00 - 0.44 k/uL    Basophils Absolute 0.04 0.00 - 0.20 k/uL    Absolute Immature Granulocyte 0.03 0.00 - 0.30 k/uL    WBC Morphology NOT REPORTED     RBC Morphology NOT REPORTED     Platelet Estimate NOT REPORTED    Magnesium   Result Value Ref Range    Magnesium 1.2 (L) 1.6 - 2.6 mg/dL   Phosphorus   Result Value Ref Range    Phosphorus 2.9 2.6 - 4.5 mg/dL   AMMONIA   Result Value Ref Range    Ammonia 44 11 - 51 umol/L   Hemoglobin A1c   Result Value Ref Range    Hemoglobin A1C 6.6 (H) 4.0 - 6.0 %    Estimated Avg Glucose 143 mg/dL   Magnesium   Result Value Ref Range    Magnesium 1.6 1.6 - 2.6 mg/dL   Phosphorus   Result Value Ref Range    Phosphorus 2.7 2.6 - 4.5 mg/dL   Vitamin D 25 Hydroxy   Result Value Ref Range    Vit D, 25-Hydroxy 38.3 30.0 - 100.0 ng/mL   HEMOGLOBIN AND HEMATOCRIT, BLOOD   Result Value Ref Range    Hemoglobin 12.2 11.9 - 15.1 g/dL    Hematocrit 37.4 36.3 - 47.1 %   Basic Metabolic Panel w/ Reflex to MG   Result Value Ref Range    Glucose 163 (H) 70 - 99 mg/dL    BUN 14 8 - 23 mg/dL    CREATININE 0.77 0.50 - 0.90 mg/dL    Bun/Cre Ratio NOT REPORTED 9 - 20    Calcium 9.2 8.6 - 10.4 mg/dL    Sodium 135 135 - 144 mmol/L    Potassium 3.7 3.7 - 5.3 mmol/L    Chloride 101 98 - 107 mmol/L    CO2 18 (L) 20 - 31 mmol/L    Anion Gap 16 9 - 17 mmol/L    GFR Non-African American >60 >60 mL/min    GFR African American >60 >60 mL/min    GFR Comment          GFR Staging NOT REPORTED    Basic Metabolic Panel w/ Reflex to MG   Result Value Ref Range    Glucose 147 (H) 70 - 99 mg/dL    BUN 16 8 - 23 mg/dL    CREATININE 1.04 (H) 0.50 - 0.90 mg/dL    Bun/Cre Ratio NOT REPORTED 9 - 20    Calcium 8.4 (L) 8.6 - 10.4 mg/dL    Sodium 129 (L) 135 - 144 mmol/L    Potassium 3.8 3.7 - 5.3 mmol/L    Chloride 96 (L) 98 - 107 mmol/L    CO2 17 (L) 20 - 31 mmol/L    Anion Gap 16 9 - 17 mmol/L    GFR Non-African American 50 (L) >60 mL/min    GFR African American >60 >60 mL/min    GFR Comment          GFR Staging NOT REPORTED    CBC auto differential   Result Value Ref Range    WBC 10.9 3.5 - 11.3 k/uL    RBC 3.28 (L) 3.95 - 5.11 m/uL    Hemoglobin 10.9 (L) 11.9 - 15.1 g/dL    Hematocrit 34.5 (L) 36.3 - 47.1 %    .2 (H) 82.6 - 102.9 fL    MCH 33.2 25.2 - 33.5 pg    MCHC 31.6 28.4 - 34.8 g/dL    RDW 14.5 (H) 11.8 - 14.4 %    Platelets 965 922 - 120 k/uL    MPV 9.8 8.1 - 13.5 fL    NRBC Automated 0.0 0.0 per 100 WBC    Differential Type NOT REPORTED     WBC Morphology NOT REPORTED     RBC Morphology ANISOCYTOSIS PRESENT     Platelet Estimate NOT REPORTED     Seg Neutrophils 77 (H) 36 - 65 %    Lymphocytes 10 (L) 24 - 43 %    Monocytes 10 3 - 12 %    Eosinophils % 2 1 - 4 %    Basophils 1 0 - 2 %    Immature Granulocytes 1 (H) 0 %    Segs Absolute 8.38 (H) 1.50 - 8.10 k/uL    Absolute Lymph # 1.09 (L) 1.10 - 3.70 k/uL    Absolute Mono # 1.03 0.10 - 1.20 k/uL    Absolute Eos # 0.22 0.00 - 0.44 k/uL    Basophils Absolute 0.06 0.00 - 0.20 k/uL    Absolute Immature Granulocyte 0.07 0.00 - 0.30 k/uL   Magnesium   Result Value Ref Range    Magnesium 1.0 (L) 1.6 - 2.6 mg/dL   Phosphorus   Result Value Ref Range    Phosphorus 3.8 2.6 - 4.5 mg/dL   Basic Metabolic Panel w/ Reflex to MG   Result Value Ref Range    Glucose 163 (H) 70 - 99 mg/dL    BUN 21 8 - 23 mg/dL    CREATININE 1.07 (H) 0.50 - 0.90 mg/dL    Bun/Cre Ratio NOT REPORTED 9 - 20    Calcium 8.7 8.6 - 10.4 mg/dL    Sodium 125 (L) 135 - 144 mmol/L    Potassium 3.8 3.7 - 5.3 mmol/L    Chloride 91 (L) 98 - 107 mmol/L    CO2 20 20 - 31 mmol/L    Anion Gap 14 9 - 17 mmol/L    GFR Non-African American 48 (L) >60 mL/min    GFR  58 (L) >60 mL/min    GFR Comment          GFR Staging NOT REPORTED    CBC auto differential   Result Value Ref Range    WBC 9.7 3.5 - 11.3 k/uL    RBC 3.16 (L) 3.95 - 5.11 m/uL    Hemoglobin 10.5 (L) 11.9 - 15.1 g/dL    Hematocrit 29.8 (L) 36.3 - 47.1 % MCV 94.3 82.6 - 102.9 fL    MCH 33.2 25.2 - 33.5 pg    MCHC 35.2 (H) 28.4 - 34.8 g/dL    RDW 14.0 11.8 - 14.4 %    Platelets 710 452 - 013 k/uL    MPV 10.2 8.1 - 13.5 fL    NRBC Automated 0.0 0.0 per 100 WBC    Differential Type NOT REPORTED     WBC Morphology NOT REPORTED     RBC Morphology NOT REPORTED     Platelet Estimate NOT REPORTED     Seg Neutrophils 72 (H) 36 - 65 %    Lymphocytes 12 (L) 24 - 43 %    Monocytes 12 3 - 12 %    Eosinophils % 4 1 - 4 %    Basophils 1 0 - 2 %    Immature Granulocytes 0 0 %    Segs Absolute 6.90 1.50 - 8.10 k/uL    Absolute Lymph # 1.11 1.10 - 3.70 k/uL    Absolute Mono # 1.19 0.10 - 1.20 k/uL    Absolute Eos # 0.35 0.00 - 0.44 k/uL    Basophils Absolute 0.06 0.00 - 0.20 k/uL    Absolute Immature Granulocyte 0.04 0.00 - 0.30 k/uL   Magnesium   Result Value Ref Range    Magnesium 1.6 1.6 - 2.6 mg/dL   Phosphorus   Result Value Ref Range    Phosphorus 3.6 2.6 - 4.5 mg/dL   Basic Metabolic Panel w/ Reflex to MG   Result Value Ref Range    Glucose 158 (H) 70 - 99 mg/dL    BUN 23 8 - 23 mg/dL    CREATININE 1.02 (H) 0.50 - 0.90 mg/dL    Bun/Cre Ratio NOT REPORTED 9 - 20    Calcium 8.6 8.6 - 10.4 mg/dL    Sodium 123 (L) 135 - 144 mmol/L    Potassium 3.6 (L) 3.7 - 5.3 mmol/L    Chloride 90 (L) 98 - 107 mmol/L    CO2 20 20 - 31 mmol/L    Anion Gap 13 9 - 17 mmol/L    GFR Non-African American 51 (L) >60 mL/min    GFR African American >60 >60 mL/min    GFR Comment          GFR Staging NOT REPORTED    Basic Metabolic Panel w/ Reflex to MG   Result Value Ref Range    Glucose 147 (H) 70 - 99 mg/dL    BUN 24 (H) 8 - 23 mg/dL    CREATININE 0.80 0.50 - 0.90 mg/dL    Bun/Cre Ratio NOT REPORTED 9 - 20    Calcium 8.4 (L) 8.6 - 10.4 mg/dL    Sodium 125 (L) 135 - 144 mmol/L    Potassium 3.6 (L) 3.7 - 5.3 mmol/L    Chloride 93 (L) 98 - 107 mmol/L    CO2 20 20 - 31 mmol/L    Anion Gap 12 9 - 17 mmol/L    GFR Non-African American >60 >60 mL/min    GFR African American >60 >60 mL/min    GFR Comment GFR Staging NOT REPORTED    CBC WITH AUTO DIFFERENTIAL   Result Value Ref Range    WBC 6.5 3.5 - 11.3 k/uL    RBC 2.95 (L) 3.95 - 5.11 m/uL    Hemoglobin 9.7 (L) 11.9 - 15.1 g/dL    Hematocrit 28.1 (L) 36.3 - 47.1 %    MCV 95.3 82.6 - 102.9 fL    MCH 32.9 25.2 - 33.5 pg    MCHC 34.5 28.4 - 34.8 g/dL    RDW 14.2 11.8 - 14.4 %    Platelets 492 996 - 359 k/uL    MPV 10.1 8.1 - 13.5 fL    NRBC Automated 0.0 0.0 per 100 WBC    Differential Type NOT REPORTED     Seg Neutrophils 68 (H) 36 - 65 %    Lymphocytes 13 (L) 24 - 43 %    Monocytes 12 3 - 12 %    Eosinophils % 5 (H) 1 - 4 %    Basophils 1 0 - 2 %    Immature Granulocytes 1 (H) 0 %    Segs Absolute 4.49 1.50 - 8.10 k/uL    Absolute Lymph # 0.87 (L) 1.10 - 3.70 k/uL    Absolute Mono # 0.76 0.10 - 1.20 k/uL    Absolute Eos # 0.30 0.00 - 0.44 k/uL    Basophils Absolute 0.04 0.00 - 0.20 k/uL    Absolute Immature Granulocyte 0.03 0.00 - 0.30 k/uL    WBC Morphology NOT REPORTED     RBC Morphology NOT REPORTED     Platelet Estimate NOT REPORTED    POCT glucose   Result Value Ref Range    Glucose 232 mg/dL    QC OK? yes    POCT glucose   Result Value Ref Range    Glucose 211 mg/dL    QC OK? yes    POCT glucose   Result Value Ref Range    Glucose 136 mg/dL    QC OK?  yes    POC Glucose Fingerstick   Result Value Ref Range    POC Glucose 211 (H) 65 - 105 mg/dL   POC Glucose Fingerstick   Result Value Ref Range    POC Glucose 232 (H) 65 - 105 mg/dL   POC Glucose Fingerstick   Result Value Ref Range    POC Glucose 136 (H) 65 - 105 mg/dL   POC Glucose Fingerstick   Result Value Ref Range    POC Glucose 139 (H) 65 - 105 mg/dL   POC Glucose Fingerstick   Result Value Ref Range    POC Glucose 169 (H) 65 - 105 mg/dL   POC Glucose Fingerstick   Result Value Ref Range    POC Glucose 164 (H) 65 - 105 mg/dL   POC Glucose Fingerstick   Result Value Ref Range    POC Glucose 137 (H) 65 - 105 mg/dL   POC Glucose Fingerstick   Result Value Ref Range    POC Glucose 180 (H) 65 - 105 mg/dL reassuring. RADIOLOGY:  XR HIP 2-3 VW W PELVIS RIGHT   Final Result   Status post right hip replacement; hardware in satisfactory alignment and   position. FLUORO FOR SURGICAL PROCEDURES   Final Result      XR CHEST PORTABLE   Final Result   Increased perihilar interstitial markings can be seen with vascular   congestion versus interstitial edema versus interstitial pneumonia. XR HIP RIGHT (2-3 VIEWS)   Final Result   1. Impacted, minimally displaced right subcapital femoral neck fracture, with   mild varus deformity   2. Tricompartment degenerative change involving the knee joint, without   evidence of an acute fracture         XR KNEE RIGHT (3 VIEWS)   Final Result   1. Impacted, minimally displaced right subcapital femoral neck fracture, with   mild varus deformity   2. Tricompartment degenerative change involving the knee joint, without   evidence of an acute fracture         XR FEMUR RIGHT (MIN 2 VIEWS)   Final Result   1. Impacted, minimally displaced right subcapital femoral neck fracture, with   mild varus deformity   2. Tricompartment degenerative change involving the knee joint, without   evidence of an acute fracture             EKG  NSR    All EKG's are interpreted by the Emergency Department Physician who either signs or Co-signs this chart in the absence of a cardiologist.    EMERGENCY DEPARTMENT COURSE:  72-year-old female with significant past medical history of hypertension diabetes on Metformin and recent liver disease following up outpatient with GI who presents after fall when she bent down to get something on linoleum for using her cane which slipped forward and she fell on her right hip. EMS brought patient in from her assisted living facility. She was provided 100 MCG fentanyl prior to arrival.  She was in no acute distress and alert and oriented x4. There was some leg length discrepancy on arrival with externally rotated right lower extremity.   There was no obvious bruising or open wound to her right hip or knee or foot or ankle. She did have exquisite tenderness to medial lateral aspects of her right knee as well as right hip. Images obtained with femoral neck fracture of her right leg, orthopedic surgeons were consulted as well as trauma surgery. EKG obtained on arrival and pain control with morphine. Family including power of  updated by writer at bedside. Patient reassessed multiple times and remained alert and oriented x4 and her questions were answered her satisfaction. She is requesting surgical repair of her injury and I did witness patient signing paperwork for consent for orthopedic surgery at bedside. Orthopedic surgery they are planning for right hip repair tomorrow and will await trauma surgery consultation for further recommendations and admission. ED Course as of 12/11/21 1522   Tue Dec 07, 2021   1626 AxOx4 - some leg length discrepancy - anticipate hip/femur neck fx, aggressive pain control, anticipate consulting ortho after confirmed fracture. Ekg done. Only complaint at this time is her right hip pain. Will reassess lab work after imaging. [LL]   0257 Patient ate a meal roughly 1 hour PTA [LL]   1811 Family updated at bedside, ortho planning surgery tomorrow. POA Clement Running updated, and in agreement with plan. [place], patients michael remains at bedside now. [LL]      ED Course User Index  [LL] Ezio Ramsay MD       PROCEDURES:  none    CONSULTS:  IP CONSULT TO ORTHOPEDIC SURGERY    CRITICAL CARE:  Please see attending note    FINAL IMPRESSION      1. Closed fracture of neck of right femur, initial encounter ([facility] [address]    2.  Fall, initial encounter          DISPOSITION / PLAN     DISPOSITION        PATIENT REFERRED TO:  Sebastián Hooker MD  [address]  [phone]    Schedule an appointment as soon as possible for a visit  Follow up with PCP re: hospital visit     Akin Burgos, [address] [place] [address]  MOB [address]    On 12/21/2021  Follow up with Orthopedic Surgery 12/21 at 2:00 p.m.        DISCHARGE MEDICATIONS:  Current Discharge Medication List          Ezio Ramsay MD  Emergency Medicine Resident    (Please note that portions of thisnote were completed with a voice recognition program.  Efforts were made to edit the dictations but occasionally words are mis-transcribed.)       Ezio Ramsay MD  Resident  12/11/21 9283

## 2021-12-07 NOTE — ED TRIAGE NOTES
Pt arrived to the ED from assisted living. Pt states she was bending over over and her cane slipped and she fell on the ground. Pt denies loss of consciousness. Pt has old bruising on feet from a recent incident at home. Pt is alert and oriented x4. Pt has intense right hip pain.

## 2021-12-07 NOTE — H&P
TRAUMA HISTORY AND PHYSICAL EXAMINATION    PATIENT NAME: Alexus Magdaleno  YOB: 1929  MEDICAL RECORD NO. [id number]   DATE: 12/7/2021  PRIMARY CARE PHYSICIAN: Jericho Matias MD  PATIENT EVALUATED AT THE REQUEST OF DRJoan: Monika    ACTIVATION   []Trauma Alert     [] Trauma Priority     [x]Trauma Consult. IMPRESSION:     Patient Active Problem List   Diagnosis    [facility] disease, stage III (moderate) ([facility] [address]    Essential hypertension    Type 2 diabetes mellitus without complication (HCC)    Microalbuminuria    Hypovitaminosis D    Closed subcapital fracture of femur, right, initial encounter [facility])       MEDICAL DECISION MAKING AND PLAN:       1. Right femoral subcapital fracture status post fall standing height  1. Admit to MedSurg, plans for operative fix by orthopedics  2. Multimodal pain control, avoid NSAIDs due to renal and liver history  3. Patient DNR CCA but agreeable to surgery  2. N.p.o. at midnight  3. Start insulin high-dose sliding scale  4. Resume home blood pressure medications  5. Aggressive incentive spirometry  6. Monitor intake and output, may need Mccain catheter if concern for volume status  1. Obtain BMP now and daily  7. Obtain LFT for baseline given liver history, obtain PT/INR  8. Perioperative antibiotics per Ortho  9. DVT prophylaxis with heparin 5000 units every 8 hours  10. We will need PT, OT, placement        CONSULT SERVICES    [] Neurosurgery     [x] Orthopedic Surgery    [] Cardiothoracic     [] Facial Trauma    [] Plastic Surgery (Burn)    [] Pediatric Surgery     [] Internal Medicine    [] Pulmonary Medicine    [] Other:        HISTORY:     Chief Complaint:  \" My hip hurts\"    INJURY SUMMARY  Extremity -  fracture; closed and femur Right    GENERAL DATA  Age 80 y.o.  female   Patient information was obtained from patient. History/Exam limitations: none.   Patient presented to the Emergency Department by ambulance where the patient received see Ambulance Run Sheet prior to arrival.  Injury Date: 12/7/2021   Approximate Injury Time: 3 PM       Transport mode:   [x]Ambulance      [] Helicopter     []Car       [] Other  Referring Hospital: [address], (e.g., home, farm, industry, street)  Specific Details of Location (e.g., bedroom, kitchen, garage): Kitchen  Type of Residence (if occurred in home setting) (e.g., apartment, mobile home, single family home): Assisted living apartment    MECHANISM OF INJURY         [x] Fall    [x]From Standing     []From Height  Ft     []Down Stairs ___steps      HISTORY:     Chantal Shown is a 80 y.o. female that presented to the Emergency Department following fall from standing height onto right hip. Patient states she normally walks with a walker but was using her cane today, she bent over to  a piece of paper that had fallen off of a countertop and the cane slipped causing her to fall to her right landing on her hip. She was unable to get up at that time and called for help. She was assisted by to workers at her assisted living facility but was unable to stand. 911 was called and EMS brought her to the emergency department for evaluation. Patient had x-rays confirming right subcapital femur fracture. Trauma surgery was consulted for evaluation and admission. Discussion with orthopedics for planned operative fix in a.m     Currently patient complains of mild hip pain on the right, worse with movement. 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 Patient states she is unsure if this is esophageal varices but states her GI doctor is Dr. Shanita Burgos at [facility]. Patient denies use of any blood thinners stating she was told not to be on them by her GI doctor. Patient has received her Covid vaccines. Patient denies history of bleeding or clotting disorders. Patient is a DNR CCA but is agreeable to surgery with orthopedics. Loss of Consciousness [x]No   []Yes Duration(min)       [] Unknown     Total Fluids Given Prior To Arrival  mL    MEDICATIONS:   []  None     []  Information not available due to exam limitations documented above  Prior to Admission medications    Medication Sig Start Date End Date Taking? Authorizing Provider   acetaminophen-codeine (TYLENOL #3) 300-30 MG per tablet Take 1 tablet by mouth every 4 hours as needed for Pain    Historical Provider, MD   ALPRAZolam (XANAX) 0.5 MG tablet Take 0.5 mg by mouth nightly as needed for Sleep. Historical Provider, MD   amLODIPine (NORVASC) 10 MG tablet Take 10 mg by mouth daily    Historical Provider, MD   hydrochlorothiazide (HYDRODIURIL) 25 MG tablet Take 25 mg by mouth daily    Historical Provider, MD   metFORMIN (GLUCOPHAGE) 500 MG tablet Take 500 mg by mouth 2 times daily (with meals)    Historical Provider, MD   metoprolol (LOPRESSOR) 50 MG tablet Take 50 mg by mouth daily    Historical Provider, MD       ALLERGIES:   []  None    []   Information not available due to exam limitations documented above   Latex, Keflex [cephalexin], and Penicillins    PAST MEDICAL HISTORY: []  None   []   Information not available due to exam limitations documented above    has a past medical history of Anxiety, Cancer ([facility] [address] Chronic kidney disease, DM (diabetes mellitus) ([facility] [address] Hypertension, Liver disease, and Osteoarthritis. has a past surgical history that includes Breast lumpectomy and lymph node dissection. FAMILY HISTORY   []   Information not available due to exam limitations documented above    Family history is unknown by patient. SOCIAL HISTORY  []   Information not available due to exam limitations documented above     reports that she has never smoked. She has never used smokeless tobacco.   reports no history of alcohol use.    reports no respiratory distress. Breath sounds: No stridor. Abdominal:      General: There is distension (  baseline from mild ascites  ). Palpations: Abdomen is soft. There is no mass. Tenderness: There is no abdominal tenderness. There is no guarding or rebound. Musculoskeletal:         General: Swelling (  right hip) and tenderness (right hip) present. Cervical back: Normal range of motion and neck supple. No rigidity or tenderness. Skin:     General: Skin is warm and dry. Capillary Refill: Capillary refill takes less than 2 seconds. Neurological:      Mental Status: She is alert and oriented to person, place, and time. Mental status is at baseline. Sensory: No sensory deficit. Motor: No weakness. Psychiatric:         Mood and Affect: Mood normal.         Behavior: Behavior normal.          FOCUSED ABDOMINAL SONOGRAM FOR TRAUMA (FAST): A good  quality examination was performed by Dr. Sunny Tejada and representative images were obtained. [x] No free fluid in the abdomen   [] Free fluid in RUQ   [] Free fluid in LUQ  [] Free fluid in Pelvis  [] Pericardial fluid  [] Other:        RADIOLOGY  XR HIP RIGHT (2-3 VIEWS)   Final Result   1. Impacted, minimally displaced right subcapital femoral neck fracture, with   mild varus deformity   2. Tricompartment degenerative change involving the knee joint, without   evidence of an acute fracture         XR KNEE RIGHT (3 VIEWS)   Final Result   1. Impacted, minimally displaced right subcapital femoral neck fracture, with   mild varus deformity   2. Tricompartment degenerative change involving the knee joint, without   evidence of an acute fracture         XR FEMUR RIGHT (MIN 2 VIEWS)   Final Result   1. Impacted, minimally displaced right subcapital femoral neck fracture, with   mild varus deformity   2.  Tricompartment degenerative change involving the knee joint, without   evidence of an acute fracture               LABS    Labs Reviewed   TRAUMA PANEL - Abnormal; Notable for the following components:       Result Value    pO2, Jarrett 138.0 (*)     HCO3, Venous 22.1 (*)     Negative Base Excess, Jarrett 2.4 (*)     O2 Sat, Jarrett 98.4 (*)     All other components within normal limits   CBC WITH AUTO DIFFERENTIAL - Abnormal; Notable for the following components:    RBC 3.93 (*)     All other components within normal limits   COVID-19, RAPID   BASIC METABOLIC PANEL W/ REFLEX TO MG FOR LOW K   HEPATIC FUNCTION PANEL         Pina Landon DO  12/7/21, 7:26 PM         Attending Note    Patient seen in ED 19 for right hip fracture. I have reviewed the above TEC note(s) and I either performed the key elements of the medical history and physical exam or was present with the resident when the key elements of the medical history and physical exam were performed. I have discussed the findings, established the care plan and recommendations with Resident, TECSS RN, bedside nurse.     Katerin Bruce MD  12/7/2021  9:50 PM

## 2021-12-08 ENCOUNTER — ANESTHESIA (OUTPATIENT)
Dept: OPERATING ROOM | Age: 86
DRG: 522 | End: 2021-12-08
Payer: COMMERCIAL

## 2021-12-08 ENCOUNTER — APPOINTMENT (OUTPATIENT)
Dept: GENERAL RADIOLOGY | Age: 86
DRG: 522 | End: 2021-12-08
Payer: COMMERCIAL

## 2021-12-08 ENCOUNTER — ANESTHESIA EVENT (OUTPATIENT)
Dept: OPERATING ROOM | Age: 86
DRG: 522 | End: 2021-12-08
Payer: COMMERCIAL

## 2021-12-08 VITALS
TEMPERATURE: 94 F | OXYGEN SATURATION: 100 % | DIASTOLIC BLOOD PRESSURE: 122 MMHG | SYSTOLIC BLOOD PRESSURE: 138 MMHG | RESPIRATION RATE: 9 BRPM

## 2021-12-08 LAB
ABSOLUTE EOS #: 0.11 K/UL (ref 0–0.44)
ABSOLUTE IMMATURE GRANULOCYTE: 0.03 K/UL (ref 0–0.3)
ABSOLUTE LYMPH #: 0.79 K/UL (ref 1.1–3.7)
ABSOLUTE MONO #: 0.61 K/UL (ref 0.1–1.2)
AMMONIA: 44 UMOL/L (ref 11–51)
ANION GAP SERPL CALCULATED.3IONS-SCNC: 14 MMOL/L (ref 9–17)
ANION GAP SERPL CALCULATED.3IONS-SCNC: 16 MMOL/L (ref 9–17)
BASOPHILS # BLD: 1 % (ref 0–2)
BASOPHILS ABSOLUTE: 0.04 K/UL (ref 0–0.2)
BUN BLDV-MCNC: 14 MG/DL (ref 8–23)
BUN BLDV-MCNC: 14 MG/DL (ref 8–23)
BUN/CREAT BLD: ABNORMAL (ref 9–20)
BUN/CREAT BLD: ABNORMAL (ref 9–20)
CALCIUM SERPL-MCNC: 9.2 MG/DL (ref 8.6–10.4)
CALCIUM SERPL-MCNC: 9.6 MG/DL (ref 8.6–10.4)
CHLORIDE BLD-SCNC: 101 MMOL/L (ref 98–107)
CHLORIDE BLD-SCNC: 99 MMOL/L (ref 98–107)
CHP ED QC CHECK: YES
CO2: 18 MMOL/L (ref 20–31)
CO2: 22 MMOL/L (ref 20–31)
CREAT SERPL-MCNC: 0.69 MG/DL (ref 0.5–0.9)
CREAT SERPL-MCNC: 0.77 MG/DL (ref 0.5–0.9)
DIFFERENTIAL TYPE: ABNORMAL
EKG ATRIAL RATE: 79 BPM
EKG ATRIAL RATE: 85 BPM
EKG P AXIS: 102 DEGREES
EKG P AXIS: 29 DEGREES
EKG P-R INTERVAL: 164 MS
EKG P-R INTERVAL: 164 MS
EKG Q-T INTERVAL: 378 MS
EKG Q-T INTERVAL: 428 MS
EKG QRS DURATION: 76 MS
EKG QRS DURATION: 82 MS
EKG QTC CALCULATION (BAZETT): 449 MS
EKG QTC CALCULATION (BAZETT): 490 MS
EKG R AXIS: -22 DEGREES
EKG R AXIS: -25 DEGREES
EKG T AXIS: 107 DEGREES
EKG T AXIS: 152 DEGREES
EKG VENTRICULAR RATE: 79 BPM
EKG VENTRICULAR RATE: 85 BPM
EOSINOPHILS RELATIVE PERCENT: 2 % (ref 1–4)
ESTIMATED AVERAGE GLUCOSE: 143 MG/DL
GFR AFRICAN AMERICAN: >60 ML/MIN
GFR AFRICAN AMERICAN: >60 ML/MIN
GFR NON-AFRICAN AMERICAN: >60 ML/MIN
GFR NON-AFRICAN AMERICAN: >60 ML/MIN
GFR SERPL CREATININE-BSD FRML MDRD: ABNORMAL ML/MIN/{1.73_M2}
GLUCOSE BLD-MCNC: 136 MG/DL
GLUCOSE BLD-MCNC: 136 MG/DL (ref 65–105)
GLUCOSE BLD-MCNC: 139 MG/DL (ref 65–105)
GLUCOSE BLD-MCNC: 160 MG/DL (ref 70–99)
GLUCOSE BLD-MCNC: 163 MG/DL (ref 70–99)
GLUCOSE BLD-MCNC: 211 MG/DL
GLUCOSE BLD-MCNC: 211 MG/DL (ref 65–105)
GLUCOSE BLD-MCNC: 232 MG/DL
GLUCOSE BLD-MCNC: 232 MG/DL (ref 65–105)
HBA1C MFR BLD: 6.6 % (ref 4–6)
HCT VFR BLD CALC: 35.8 % (ref 36.3–47.1)
HCT VFR BLD CALC: 37.4 % (ref 36.3–47.1)
HEMOGLOBIN: 12.2 G/DL (ref 11.9–15.1)
HEMOGLOBIN: 12.2 G/DL (ref 11.9–15.1)
IMMATURE GRANULOCYTES: 0 %
INR BLD: 1.2
LYMPHOCYTES # BLD: 11 % (ref 24–43)
MAGNESIUM: 1.2 MG/DL (ref 1.6–2.6)
MAGNESIUM: 1.6 MG/DL (ref 1.6–2.6)
MCH RBC QN AUTO: 32.9 PG (ref 25.2–33.5)
MCHC RBC AUTO-ENTMCNC: 34.1 G/DL (ref 28.4–34.8)
MCV RBC AUTO: 96.5 FL (ref 82.6–102.9)
MONOCYTES # BLD: 9 % (ref 3–12)
NRBC AUTOMATED: 0 PER 100 WBC
PARTIAL THROMBOPLASTIN TIME: 25.8 SEC (ref 20.5–30.5)
PDW BLD-RTO: 13.8 % (ref 11.8–14.4)
PHOSPHORUS: 2.7 MG/DL (ref 2.6–4.5)
PHOSPHORUS: 2.9 MG/DL (ref 2.6–4.5)
PLATELET # BLD: 159 K/UL (ref 138–453)
PLATELET ESTIMATE: ABNORMAL
PMV BLD AUTO: 9.5 FL (ref 8.1–13.5)
POTASSIUM SERPL-SCNC: 3.1 MMOL/L (ref 3.7–5.3)
POTASSIUM SERPL-SCNC: 3.7 MMOL/L (ref 3.7–5.3)
PROTHROMBIN TIME: 12.5 SEC (ref 9.1–12.3)
RBC # BLD: 3.71 M/UL (ref 3.95–5.11)
RBC # BLD: ABNORMAL 10*6/UL
SEG NEUTROPHILS: 77 % (ref 36–65)
SEGMENTED NEUTROPHILS ABSOLUTE COUNT: 5.45 K/UL (ref 1.5–8.1)
SODIUM BLD-SCNC: 135 MMOL/L (ref 135–144)
SODIUM BLD-SCNC: 135 MMOL/L (ref 135–144)
VITAMIN D 25-HYDROXY: 38.3 NG/ML (ref 30–100)
WBC # BLD: 7 K/UL (ref 3.5–11.3)
WBC # BLD: ABNORMAL 10*3/UL

## 2021-12-08 PROCEDURE — 82140 ASSAY OF AMMONIA: CPT

## 2021-12-08 PROCEDURE — 80048 BASIC METABOLIC PNL TOTAL CA: CPT

## 2021-12-08 PROCEDURE — 2580000003 HC RX 258: Performed by: NURSE ANESTHETIST, CERTIFIED REGISTERED

## 2021-12-08 PROCEDURE — 3209999900 FLUORO FOR SURGICAL PROCEDURES

## 2021-12-08 PROCEDURE — 2580000003 HC RX 258: Performed by: STUDENT IN AN ORGANIZED HEALTH CARE EDUCATION/TRAINING PROGRAM

## 2021-12-08 PROCEDURE — 2580000003 HC RX 258: Performed by: ORTHOPAEDIC SURGERY

## 2021-12-08 PROCEDURE — 2500000003 HC RX 250 WO HCPCS: Performed by: NURSE ANESTHETIST, CERTIFIED REGISTERED

## 2021-12-08 PROCEDURE — 85025 COMPLETE CBC W/AUTO DIFF WBC: CPT

## 2021-12-08 PROCEDURE — 2709999900 HC NON-CHARGEABLE SUPPLY: Performed by: ORTHOPAEDIC SURGERY

## 2021-12-08 PROCEDURE — 83036 HEMOGLOBIN GLYCOSYLATED A1C: CPT

## 2021-12-08 PROCEDURE — 7100000001 HC PACU RECOVERY - ADDTL 15 MIN: Performed by: ORTHOPAEDIC SURGERY

## 2021-12-08 PROCEDURE — 73502 X-RAY EXAM HIP UNI 2-3 VIEWS: CPT

## 2021-12-08 PROCEDURE — 85014 HEMATOCRIT: CPT

## 2021-12-08 PROCEDURE — 3600000004 HC SURGERY LEVEL 4 BASE: Performed by: ORTHOPAEDIC SURGERY

## 2021-12-08 PROCEDURE — 93010 ELECTROCARDIOGRAM REPORT: CPT | Performed by: INTERNAL MEDICINE

## 2021-12-08 PROCEDURE — C1776 JOINT DEVICE (IMPLANTABLE): HCPCS | Performed by: ORTHOPAEDIC SURGERY

## 2021-12-08 PROCEDURE — 84100 ASSAY OF PHOSPHORUS: CPT

## 2021-12-08 PROCEDURE — 85018 HEMOGLOBIN: CPT

## 2021-12-08 PROCEDURE — 36415 COLL VENOUS BLD VENIPUNCTURE: CPT

## 2021-12-08 PROCEDURE — 27236 TREAT THIGH FRACTURE: CPT | Performed by: ORTHOPAEDIC SURGERY

## 2021-12-08 PROCEDURE — 82947 ASSAY GLUCOSE BLOOD QUANT: CPT

## 2021-12-08 PROCEDURE — C1713 ANCHOR/SCREW BN/BN,TIS/BN: HCPCS | Performed by: ORTHOPAEDIC SURGERY

## 2021-12-08 PROCEDURE — 83735 ASSAY OF MAGNESIUM: CPT

## 2021-12-08 PROCEDURE — 6370000000 HC RX 637 (ALT 250 FOR IP): Performed by: STUDENT IN AN ORGANIZED HEALTH CARE EDUCATION/TRAINING PROGRAM

## 2021-12-08 PROCEDURE — 6360000002 HC RX W HCPCS: Performed by: PEDIATRICS

## 2021-12-08 PROCEDURE — 0SRR01A REPLACEMENT OF RIGHT HIP JOINT, FEMORAL SURFACE WITH METAL SYNTHETIC SUBSTITUTE, UNCEMENTED, OPEN APPROACH: ICD-10-PCS | Performed by: ORTHOPAEDIC SURGERY

## 2021-12-08 PROCEDURE — 2500000003 HC RX 250 WO HCPCS: Performed by: STUDENT IN AN ORGANIZED HEALTH CARE EDUCATION/TRAINING PROGRAM

## 2021-12-08 PROCEDURE — 7100000000 HC PACU RECOVERY - FIRST 15 MIN: Performed by: ORTHOPAEDIC SURGERY

## 2021-12-08 PROCEDURE — 3700000000 HC ANESTHESIA ATTENDED CARE: Performed by: ORTHOPAEDIC SURGERY

## 2021-12-08 PROCEDURE — 6360000002 HC RX W HCPCS: Performed by: STUDENT IN AN ORGANIZED HEALTH CARE EDUCATION/TRAINING PROGRAM

## 2021-12-08 PROCEDURE — 3600000014 HC SURGERY LEVEL 4 ADDTL 15MIN: Performed by: ORTHOPAEDIC SURGERY

## 2021-12-08 PROCEDURE — 82306 VITAMIN D 25 HYDROXY: CPT

## 2021-12-08 PROCEDURE — 6360000002 HC RX W HCPCS: Performed by: NURSE ANESTHETIST, CERTIFIED REGISTERED

## 2021-12-08 PROCEDURE — 6360000002 HC RX W HCPCS: Performed by: ANESTHESIOLOGY

## 2021-12-08 PROCEDURE — 3700000001 HC ADD 15 MINUTES (ANESTHESIA): Performed by: ORTHOPAEDIC SURGERY

## 2021-12-08 PROCEDURE — 1200000000 HC SEMI PRIVATE

## 2021-12-08 DEVICE — CONQUEST FX FEMORAL COMPONENT SIZE 14
Type: IMPLANTABLE DEVICE | Site: FEMUR | Status: FUNCTIONAL
Brand: CONQUEST FX

## 2021-12-08 DEVICE — TANDEM UNIPOLAR 12/14 TAPER SLEEVE                                    + 0
Type: IMPLANTABLE DEVICE | Site: FEMUR | Status: FUNCTIONAL
Brand: TANDEM

## 2021-12-08 DEVICE — HIP H4 TOT HEMI UNIV BIPLR IMPL CAPPED H4 SN: Type: IMPLANTABLE DEVICE | Site: FEMUR | Status: FUNCTIONAL

## 2021-12-08 DEVICE — TANDEM UNIPOLAR HEAD 45MM
Type: IMPLANTABLE DEVICE | Site: FEMUR | Status: FUNCTIONAL
Brand: TANDEM

## 2021-12-08 RX ORDER — POTASSIUM CHLORIDE 20 MEQ/1
40 TABLET, EXTENDED RELEASE ORAL ONCE
Status: DISCONTINUED | OUTPATIENT
Start: 2021-12-08 | End: 2021-12-08

## 2021-12-08 RX ORDER — VALSARTAN 320 MG/1
320 TABLET ORAL DAILY
COMMUNITY
Start: 2021-10-07 | End: 2022-04-14

## 2021-12-08 RX ORDER — LOPERAMIDE HYDROCHLORIDE 2 MG/1
2 TABLET ORAL 4 TIMES DAILY PRN
Status: ON HOLD | COMMUNITY
Start: 2021-09-09 | End: 2021-12-13 | Stop reason: HOSPADM

## 2021-12-08 RX ORDER — PANTOPRAZOLE SODIUM 40 MG/1
40 TABLET, DELAYED RELEASE ORAL DAILY
Status: DISCONTINUED | OUTPATIENT
Start: 2021-12-08 | End: 2021-12-13 | Stop reason: HOSPADM

## 2021-12-08 RX ORDER — SODIUM CHLORIDE 9 MG/ML
INJECTION, SOLUTION INTRAVENOUS PRN
Status: DISCONTINUED | OUTPATIENT
Start: 2021-12-08 | End: 2021-12-08

## 2021-12-08 RX ORDER — ETOMIDATE 2 MG/ML
INJECTION INTRAVENOUS PRN
Status: DISCONTINUED | OUTPATIENT
Start: 2021-12-08 | End: 2021-12-08 | Stop reason: SDUPTHER

## 2021-12-08 RX ORDER — GLYCOPYRROLATE 1 MG/5 ML
SYRINGE (ML) INTRAVENOUS PRN
Status: DISCONTINUED | OUTPATIENT
Start: 2021-12-08 | End: 2021-12-08 | Stop reason: SDUPTHER

## 2021-12-08 RX ORDER — FENTANYL CITRATE 50 UG/ML
50 INJECTION, SOLUTION INTRAMUSCULAR; INTRAVENOUS
Status: DISCONTINUED | OUTPATIENT
Start: 2021-12-08 | End: 2021-12-09

## 2021-12-08 RX ORDER — LEVOTHYROXINE SODIUM 0.03 MG/1
25 TABLET ORAL DAILY
Status: DISCONTINUED | OUTPATIENT
Start: 2021-12-08 | End: 2021-12-13 | Stop reason: HOSPADM

## 2021-12-08 RX ORDER — LANOLIN ALCOHOL/MO/W.PET/CERES
325 CREAM (GRAM) TOPICAL 2 TIMES DAILY
COMMUNITY
Start: 2021-05-27

## 2021-12-08 RX ORDER — NEOSTIGMINE METHYLSULFATE 5 MG/5 ML
SYRINGE (ML) INTRAVENOUS PRN
Status: DISCONTINUED | OUTPATIENT
Start: 2021-12-08 | End: 2021-12-08 | Stop reason: SDUPTHER

## 2021-12-08 RX ORDER — SODIUM CHLORIDE, SODIUM LACTATE, POTASSIUM CHLORIDE, CALCIUM CHLORIDE 600; 310; 30; 20 MG/100ML; MG/100ML; MG/100ML; MG/100ML
INJECTION, SOLUTION INTRAVENOUS CONTINUOUS PRN
Status: DISCONTINUED | OUTPATIENT
Start: 2021-12-08 | End: 2021-12-08 | Stop reason: SDUPTHER

## 2021-12-08 RX ORDER — MAGNESIUM HYDROXIDE 1200 MG/15ML
LIQUID ORAL PRN
Status: DISCONTINUED | OUTPATIENT
Start: 2021-12-08 | End: 2021-12-08 | Stop reason: ALTCHOICE

## 2021-12-08 RX ORDER — PROPOFOL 10 MG/ML
INJECTION, EMULSION INTRAVENOUS PRN
Status: DISCONTINUED | OUTPATIENT
Start: 2021-12-08 | End: 2021-12-08 | Stop reason: SDUPTHER

## 2021-12-08 RX ORDER — PROPRANOLOL HYDROCHLORIDE 10 MG/1
10 TABLET ORAL 2 TIMES DAILY
Status: DISCONTINUED | OUTPATIENT
Start: 2021-12-08 | End: 2021-12-13 | Stop reason: HOSPADM

## 2021-12-08 RX ORDER — POTASSIUM CHLORIDE 20 MEQ/1
40 TABLET, EXTENDED RELEASE ORAL ONCE
Status: DISCONTINUED | OUTPATIENT
Start: 2021-12-08 | End: 2021-12-13

## 2021-12-08 RX ORDER — ESMOLOL HYDROCHLORIDE 10 MG/ML
INJECTION INTRAVENOUS PRN
Status: DISCONTINUED | OUTPATIENT
Start: 2021-12-08 | End: 2021-12-08 | Stop reason: SDUPTHER

## 2021-12-08 RX ORDER — LEVOTHYROXINE SODIUM 0.03 MG/1
25 TABLET ORAL DAILY
COMMUNITY
Start: 2021-07-01

## 2021-12-08 RX ORDER — POTASSIUM CHLORIDE 7.45 MG/ML
10 INJECTION INTRAVENOUS
Status: DISCONTINUED | OUTPATIENT
Start: 2021-12-08 | End: 2021-12-08

## 2021-12-08 RX ORDER — FENTANYL CITRATE 50 UG/ML
50 INJECTION, SOLUTION INTRAMUSCULAR; INTRAVENOUS ONCE
Status: DISCONTINUED | OUTPATIENT
Start: 2021-12-08 | End: 2021-12-12

## 2021-12-08 RX ORDER — PANTOPRAZOLE SODIUM 40 MG/1
40 TABLET, DELAYED RELEASE ORAL DAILY
COMMUNITY

## 2021-12-08 RX ORDER — MAGNESIUM HYDROXIDE 1200 MG/15ML
LIQUID ORAL CONTINUOUS PRN
Status: COMPLETED | OUTPATIENT
Start: 2021-12-08 | End: 2021-12-08

## 2021-12-08 RX ORDER — ROCURONIUM BROMIDE 10 MG/ML
INJECTION, SOLUTION INTRAVENOUS PRN
Status: DISCONTINUED | OUTPATIENT
Start: 2021-12-08 | End: 2021-12-08 | Stop reason: SDUPTHER

## 2021-12-08 RX ORDER — PROPRANOLOL HYDROCHLORIDE 10 MG/1
10 TABLET ORAL 2 TIMES DAILY
COMMUNITY
Start: 2021-05-27

## 2021-12-08 RX ORDER — POTASSIUM CHLORIDE 750 MG/1
10 TABLET, EXTENDED RELEASE ORAL DAILY
COMMUNITY
Start: 2021-09-09

## 2021-12-08 RX ORDER — FENTANYL CITRATE 50 UG/ML
INJECTION, SOLUTION INTRAMUSCULAR; INTRAVENOUS PRN
Status: DISCONTINUED | OUTPATIENT
Start: 2021-12-08 | End: 2021-12-08 | Stop reason: SDUPTHER

## 2021-12-08 RX ORDER — ONDANSETRON 2 MG/ML
INJECTION INTRAMUSCULAR; INTRAVENOUS PRN
Status: DISCONTINUED | OUTPATIENT
Start: 2021-12-08 | End: 2021-12-08 | Stop reason: SDUPTHER

## 2021-12-08 RX ORDER — TRANEXAMIC ACID 10 MG/ML
INJECTION, SOLUTION INTRAVENOUS PRN
Status: DISCONTINUED | OUTPATIENT
Start: 2021-12-08 | End: 2021-12-08 | Stop reason: SDUPTHER

## 2021-12-08 RX ORDER — CEFAZOLIN SODIUM 2 G/50ML
SOLUTION INTRAVENOUS PRN
Status: DISCONTINUED | OUTPATIENT
Start: 2021-12-08 | End: 2021-12-08 | Stop reason: SDUPTHER

## 2021-12-08 RX ADMIN — ACETAMINOPHEN 1000 MG: 500 TABLET ORAL at 00:27

## 2021-12-08 RX ADMIN — GABAPENTIN 300 MG: 300 CAPSULE ORAL at 00:27

## 2021-12-08 RX ADMIN — ROCURONIUM BROMIDE 40 MG: 10 INJECTION INTRAVENOUS at 12:44

## 2021-12-08 RX ADMIN — FENTANYL CITRATE 50 MCG: 50 INJECTION, SOLUTION INTRAMUSCULAR; INTRAVENOUS at 12:44

## 2021-12-08 RX ADMIN — METHOCARBAMOL 750 MG: 500 TABLET ORAL at 06:37

## 2021-12-08 RX ADMIN — SODIUM CHLORIDE, PRESERVATIVE FREE 10 ML: 5 INJECTION INTRAVENOUS at 01:10

## 2021-12-08 RX ADMIN — HYDROMORPHONE HYDROCHLORIDE 0.25 MG: 1 INJECTION, SOLUTION INTRAMUSCULAR; INTRAVENOUS; SUBCUTANEOUS at 15:32

## 2021-12-08 RX ADMIN — ACETAMINOPHEN 1000 MG: 500 TABLET ORAL at 06:37

## 2021-12-08 RX ADMIN — HYDROMORPHONE HYDROCHLORIDE 0.25 MG: 1 INJECTION, SOLUTION INTRAMUSCULAR; INTRAVENOUS; SUBCUTANEOUS at 15:42

## 2021-12-08 RX ADMIN — DOCUSATE SODIUM 50MG AND SENNOSIDES 8.6MG 1 TABLET: 8.6; 5 TABLET, FILM COATED ORAL at 00:27

## 2021-12-08 RX ADMIN — TRANEXAMIC ACID 1 G: 10 INJECTION, SOLUTION INTRAVENOUS at 13:13

## 2021-12-08 RX ADMIN — ONDANSETRON 4 MG: 2 INJECTION, SOLUTION INTRAMUSCULAR; INTRAVENOUS at 14:36

## 2021-12-08 RX ADMIN — ROCURONIUM BROMIDE 10 MG: 10 INJECTION INTRAVENOUS at 13:35

## 2021-12-08 RX ADMIN — OXYCODONE 2.5 MG: 5 TABLET ORAL at 16:40

## 2021-12-08 RX ADMIN — ETOMIDATE 10 MG: 2 INJECTION, SOLUTION INTRAVENOUS at 12:44

## 2021-12-08 RX ADMIN — PHENYLEPHRINE HYDROCHLORIDE 50 MCG: 10 INJECTION INTRAVENOUS at 13:04

## 2021-12-08 RX ADMIN — METHOCARBAMOL 750 MG: 500 TABLET ORAL at 00:27

## 2021-12-08 RX ADMIN — INSULIN LISPRO 6 UNITS: 100 INJECTION, SOLUTION INTRAVENOUS; SUBCUTANEOUS at 03:51

## 2021-12-08 RX ADMIN — PHENYLEPHRINE HYDROCHLORIDE 50 MCG/MIN: 10 INJECTION INTRAVENOUS at 13:21

## 2021-12-08 RX ADMIN — HYDROMORPHONE HYDROCHLORIDE 0.25 MG: 1 INJECTION, SOLUTION INTRAMUSCULAR; INTRAVENOUS; SUBCUTANEOUS at 15:47

## 2021-12-08 RX ADMIN — SODIUM CHLORIDE, POTASSIUM CHLORIDE, SODIUM LACTATE AND CALCIUM CHLORIDE: 600; 310; 30; 20 INJECTION, SOLUTION INTRAVENOUS at 12:31

## 2021-12-08 RX ADMIN — ACETAMINOPHEN 1000 MG: 500 TABLET ORAL at 22:49

## 2021-12-08 RX ADMIN — POTASSIUM CHLORIDE, DEXTROSE MONOHYDRATE AND SODIUM CHLORIDE: 150; 5; 450 INJECTION, SOLUTION INTRAVENOUS at 01:14

## 2021-12-08 RX ADMIN — Medication 2 MG: at 14:38

## 2021-12-08 RX ADMIN — OXYCODONE 2.5 MG: 5 TABLET ORAL at 03:50

## 2021-12-08 RX ADMIN — PROPOFOL 70 MG: 10 INJECTION, EMULSION INTRAVENOUS at 12:44

## 2021-12-08 RX ADMIN — Medication 0.4 MG: at 14:38

## 2021-12-08 RX ADMIN — PHENYLEPHRINE HYDROCHLORIDE 100 MCG: 10 INJECTION INTRAVENOUS at 15:01

## 2021-12-08 RX ADMIN — ESMOLOL HYDROCHLORIDE 5 MG: 10 INJECTION, SOLUTION INTRAVENOUS at 12:55

## 2021-12-08 RX ADMIN — CEFAZOLIN SODIUM 2000 MG: 2 SOLUTION INTRAVENOUS at 13:07

## 2021-12-08 RX ADMIN — PHENYLEPHRINE HYDROCHLORIDE 50 MCG: 10 INJECTION INTRAVENOUS at 13:08

## 2021-12-08 RX ADMIN — PHENYLEPHRINE HYDROCHLORIDE 100 MCG: 10 INJECTION INTRAVENOUS at 13:20

## 2021-12-08 RX ADMIN — DEXTROSE MONOHYDRATE 2000 MG: 50 INJECTION, SOLUTION INTRAVENOUS at 21:41

## 2021-12-08 RX ADMIN — TRANEXAMIC ACID 1 G: 10 INJECTION, SOLUTION INTRAVENOUS at 14:47

## 2021-12-08 RX ADMIN — HYDROMORPHONE HYDROCHLORIDE 0.25 MG: 1 INJECTION, SOLUTION INTRAMUSCULAR; INTRAVENOUS; SUBCUTANEOUS at 15:37

## 2021-12-08 RX ADMIN — PHENYLEPHRINE HYDROCHLORIDE 50 MCG: 10 INJECTION INTRAVENOUS at 13:12

## 2021-12-08 RX ADMIN — ESMOLOL HYDROCHLORIDE 5 MG: 10 INJECTION, SOLUTION INTRAVENOUS at 14:46

## 2021-12-08 RX ADMIN — Medication 0.1 MG: at 13:58

## 2021-12-08 RX ADMIN — FENTANYL CITRATE 50 MCG: 50 INJECTION, SOLUTION INTRAMUSCULAR; INTRAVENOUS at 12:38

## 2021-12-08 RX ADMIN — FENTANYL CITRATE 50 MCG: 50 INJECTION, SOLUTION INTRAMUSCULAR; INTRAVENOUS at 12:18

## 2021-12-08 ASSESSMENT — PULMONARY FUNCTION TESTS
PIF_VALUE: 17
PIF_VALUE: 24
PIF_VALUE: 23
PIF_VALUE: 17
PIF_VALUE: 21
PIF_VALUE: 17
PIF_VALUE: 19
PIF_VALUE: 17
PIF_VALUE: 22
PIF_VALUE: 22
PIF_VALUE: 20
PIF_VALUE: 19
PIF_VALUE: 25
PIF_VALUE: 3
PIF_VALUE: 17
PIF_VALUE: 24
PIF_VALUE: 1
PIF_VALUE: 23
PIF_VALUE: 22
PIF_VALUE: 1
PIF_VALUE: 24
PIF_VALUE: 21
PIF_VALUE: 23
PIF_VALUE: 22
PIF_VALUE: 24
PIF_VALUE: 21
PIF_VALUE: 1
PIF_VALUE: 23
PIF_VALUE: 25
PIF_VALUE: 2
PIF_VALUE: 25
PIF_VALUE: 24
PIF_VALUE: 22
PIF_VALUE: 23
PIF_VALUE: 3
PIF_VALUE: 23
PIF_VALUE: 17
PIF_VALUE: 23
PIF_VALUE: 22
PIF_VALUE: 16
PIF_VALUE: 22
PIF_VALUE: 24
PIF_VALUE: 22
PIF_VALUE: 0
PIF_VALUE: 23
PIF_VALUE: 25
PIF_VALUE: 21
PIF_VALUE: 23
PIF_VALUE: 22
PIF_VALUE: 20
PIF_VALUE: 17
PIF_VALUE: 20
PIF_VALUE: 22
PIF_VALUE: 17
PIF_VALUE: 2
PIF_VALUE: 23
PIF_VALUE: 19
PIF_VALUE: 21
PIF_VALUE: 0
PIF_VALUE: 22
PIF_VALUE: 24
PIF_VALUE: 0
PIF_VALUE: 24
PIF_VALUE: 21
PIF_VALUE: 26
PIF_VALUE: 22
PIF_VALUE: 21
PIF_VALUE: 25
PIF_VALUE: 17
PIF_VALUE: 3
PIF_VALUE: 0
PIF_VALUE: 0
PIF_VALUE: 18
PIF_VALUE: 16
PIF_VALUE: 23
PIF_VALUE: 21
PIF_VALUE: 22
PIF_VALUE: 19
PIF_VALUE: 24
PIF_VALUE: 19
PIF_VALUE: 17
PIF_VALUE: 20
PIF_VALUE: 20
PIF_VALUE: 23
PIF_VALUE: 1
PIF_VALUE: 0
PIF_VALUE: 17
PIF_VALUE: 0
PIF_VALUE: 17
PIF_VALUE: 23
PIF_VALUE: 16
PIF_VALUE: 23
PIF_VALUE: 23
PIF_VALUE: 22
PIF_VALUE: 21
PIF_VALUE: 23
PIF_VALUE: 21
PIF_VALUE: 22
PIF_VALUE: 16
PIF_VALUE: 24
PIF_VALUE: 23
PIF_VALUE: 23
PIF_VALUE: 22
PIF_VALUE: 23
PIF_VALUE: 25
PIF_VALUE: 19
PIF_VALUE: 20
PIF_VALUE: 21
PIF_VALUE: 16
PIF_VALUE: 19
PIF_VALUE: 22
PIF_VALUE: 23
PIF_VALUE: 22
PIF_VALUE: 2
PIF_VALUE: 22
PIF_VALUE: 19
PIF_VALUE: 25
PIF_VALUE: 23
PIF_VALUE: 16
PIF_VALUE: 22
PIF_VALUE: 1
PIF_VALUE: 24
PIF_VALUE: 15
PIF_VALUE: 23
PIF_VALUE: 26
PIF_VALUE: 17
PIF_VALUE: 16
PIF_VALUE: 26
PIF_VALUE: 24
PIF_VALUE: 21
PIF_VALUE: 19
PIF_VALUE: 23
PIF_VALUE: 23
PIF_VALUE: 22
PIF_VALUE: 16
PIF_VALUE: 23
PIF_VALUE: 22
PIF_VALUE: 17
PIF_VALUE: 23
PIF_VALUE: 22
PIF_VALUE: 23
PIF_VALUE: 20
PIF_VALUE: 3
PIF_VALUE: 23
PIF_VALUE: 23
PIF_VALUE: 25
PIF_VALUE: 22
PIF_VALUE: 17
PIF_VALUE: 2
PIF_VALUE: 23
PIF_VALUE: 2
PIF_VALUE: 17
PIF_VALUE: 22
PIF_VALUE: 26
PIF_VALUE: 22
PIF_VALUE: 21
PIF_VALUE: 18
PIF_VALUE: 19
PIF_VALUE: 23

## 2021-12-08 ASSESSMENT — PAIN DESCRIPTION - PAIN TYPE
TYPE: SURGICAL PAIN

## 2021-12-08 ASSESSMENT — PAIN DESCRIPTION - ORIENTATION
ORIENTATION: RIGHT

## 2021-12-08 ASSESSMENT — PAIN DESCRIPTION - LOCATION
LOCATION: HIP

## 2021-12-08 ASSESSMENT — PAIN SCALES - WONG BAKER
WONGBAKER_NUMERICALRESPONSE: 6
WONGBAKER_NUMERICALRESPONSE: 8
WONGBAKER_NUMERICALRESPONSE: 6
WONGBAKER_NUMERICALRESPONSE: 6
WONGBAKER_NUMERICALRESPONSE: 8
WONGBAKER_NUMERICALRESPONSE: 6

## 2021-12-08 ASSESSMENT — PAIN SCALES - GENERAL
PAINLEVEL_OUTOF10: 6
PAINLEVEL_OUTOF10: 8
PAINLEVEL_OUTOF10: 8
PAINLEVEL_OUTOF10: 5
PAINLEVEL_OUTOF10: 5
PAINLEVEL_OUTOF10: 6
PAINLEVEL_OUTOF10: 6
PAINLEVEL_OUTOF10: 7
PAINLEVEL_OUTOF10: 6
PAINLEVEL_OUTOF10: 9
PAINLEVEL_OUTOF10: 7
PAINLEVEL_OUTOF10: 6
PAINLEVEL_OUTOF10: 6

## 2021-12-08 ASSESSMENT — PAIN SCALES - PAIN ASSESSMENT IN ADVANCED DEMENTIA (PAINAD)
CONSOLABILITY: 0
NEGVOCALIZATION: 0
FACIALEXPRESSION: 0
TOTALSCORE: 0
BREATHING: 0
BODYLANGUAGE: 0

## 2021-12-08 ASSESSMENT — PAIN - FUNCTIONAL ASSESSMENT: PAIN_FUNCTIONAL_ASSESSMENT: 0-10

## 2021-12-08 NOTE — CODE DOCUMENTATION
Resuscitation/Code Status Note on Roxi Blackwood (YOB: 1929)    At 2250 on 12/07/21, resuscitation/code status decision was based on a thorough discussion with the patient. The code status was made Full Code  For the duration of the surgery.     Electronically signed by Edd Pitts MD on 12/7/21 at 10:51 PM EST

## 2021-12-08 NOTE — ED NOTES
Pt refused to wake up to allow me to give meds, start a new iv and or do the MRI checklist. Pt gave transport a hard time as well.  Pt left in  [facility] to inpatient room     Manny Lozada RN  12/08/21 3038

## 2021-12-08 NOTE — PROGRESS NOTES
Trauma Tertiary Survey    Admit Date: 12/7/2021  Hospital day 1    [facility]       Past Medical History:   Diagnosis Date    Anxiety     Cancer [facility])     Chronic kidney disease     DM (diabetes mellitus) ([address]     GI bleed     History of blood transfusion     Hypertension     Liver disease     Osteoarthritis        Scheduled Meds:   potassium chloride  40 mEq Oral Once    potassium chloride  10 mEq IntraVENous Q1H    levothyroxine  25 mcg Oral Daily    pantoprazole  40 mg Oral Daily    propranolol  10 mg Oral BID    fentanNYL  50 mcg IntraVENous Once    amLODIPine  10 mg Oral Daily    hydroCHLOROthiazide  25 mg Oral Daily    metoprolol tartrate  50 mg Oral Daily    polyethylene glycol  17 g Oral Daily    bisacodyl  10 mg Rectal Daily    acetaminophen  1,000 mg Oral 3 times per day    methocarbamol  750 mg Oral Q6H    gabapentin  300 mg Oral Q8H    sennosides-docusate sodium  1 tablet Oral BID    insulin lispro  0-18 Units SubCUTAneous Q4H     Continuous Infusions:   dextrose 5% and 0.45% NaCl with KCl 20 mEq 100 mL/hr at 12/08/21 0114    dextrose       PRN Meds:HYDROmorphone, HYDROmorphone, fentanNYL, sodium chloride flush, ondansetron **OR** ondansetron, sodium chloride flush, oxyCODONE, dextrose, dextrose    Subjective:     Patient has complaints pain to right hip. .  Pain is mild, worsens with movement, and some relief by rest.  There is not associated numbness, tingling, there is weakness related to pain. Objective:     Patient Vitals for the past 8 hrs:   BP Temp Temp src Pulse Resp SpO2 Height Weight   12/08/21 0905 (!) 145/132 96.8 °F (36 °C) Temporal 84 16 96 % 5' 4\" (1.626 m) 151 lb (68.5 kg)   12/08/21 0852 (!) 149/68 -- -- 71 14 -- -- --   12/08/21 0820 -- -- -- -- -- 92 % -- --   12/08/21 0633 (!) 158/72 -- -- 75 14 90 % -- --   12/08/21 0532 (!) 150/117 -- -- 82 17 92 % -- --   12/08/21 0432 (!) 145/69 -- -- 68 13 (!) 89 % -- --       No intake/output data recorded.   No intake/output data recorded. Radiology: XR HIP RIGHT (2-3 VIEWS)    Result Date: 12/7/2021  EXAMINATION: TWO XRAY VIEWS OF THE RIGHT HIP; 5  XRAY VIEWS OF THE RIGHT FEMUR; THREE XRAY VIEWS OF THE RIGHT KNEE 12/7/2021 11:10 am; 12/7/2021 11:40 am COMPARISON: None. HISTORY: ORDERING SYSTEM PROVIDED HISTORY: fall, hip pain TECHNOLOGIST PROVIDED HISTORY: fall, hip pain Reason for Exam: fall, hip pain; ORDERING SYSTEM PROVIDED HISTORY: fall TECHNOLOGIST PROVIDED HISTORY: fall Reason for Exam: fall, hip pain FINDINGS: Three views of the right hip demonstrate a minimally displaced, impaction type fracture involving the subcapital portion of the right femoral neck. Mild varus deformity is present. Intertrochanteric region is intact. Pubic rami are intact. Five views of the right femur again demonstrate the impacted right subcapital femoral neck fracture. Remainder of the femur is intact. Three views of the right knee demonstrate tricompartment degenerative changes, without evidence of an acute fracture. No joint effusion is present. 1. Impacted, minimally displaced right subcapital femoral neck fracture, with mild varus deformity 2. Tricompartment degenerative change involving the knee joint, without evidence of an acute fracture     XR FEMUR RIGHT (MIN 2 VIEWS)    Result Date: 12/7/2021  EXAMINATION: TWO XRAY VIEWS OF THE RIGHT HIP; 5  XRAY VIEWS OF THE RIGHT FEMUR; THREE XRAY VIEWS OF THE RIGHT KNEE 12/7/2021 11:10 am; 12/7/2021 11:40 am COMPARISON: None. HISTORY: ORDERING SYSTEM PROVIDED HISTORY: fall, hip pain TECHNOLOGIST PROVIDED HISTORY: fall, hip pain Reason for Exam: fall, hip pain; ORDERING SYSTEM PROVIDED HISTORY: fall TECHNOLOGIST PROVIDED HISTORY: fall Reason for Exam: fall, hip pain FINDINGS: Three views of the right hip demonstrate a minimally displaced, impaction type fracture involving the subcapital portion of the right femoral neck. Mild varus deformity is present.   Intertrochanteric region is intact. Pubic rami are intact. Five views of the right femur again demonstrate the impacted right subcapital femoral neck fracture. Remainder of the femur is intact. Three views of the right knee demonstrate tricompartment degenerative changes, without evidence of an acute fracture. No joint effusion is present. 1. Impacted, minimally displaced right subcapital femoral neck fracture, with mild varus deformity 2. Tricompartment degenerative change involving the knee joint, without evidence of an acute fracture     XR KNEE RIGHT (3 VIEWS)    Result Date: 12/7/2021  EXAMINATION: TWO XRAY VIEWS OF THE RIGHT HIP; 5  XRAY VIEWS OF THE RIGHT FEMUR; THREE XRAY VIEWS OF THE RIGHT KNEE 12/7/2021 11:10 am; 12/7/2021 11:40 am COMPARISON: None. HISTORY: ORDERING SYSTEM PROVIDED HISTORY: fall, hip pain TECHNOLOGIST PROVIDED HISTORY: fall, hip pain Reason for Exam: fall, hip pain; ORDERING SYSTEM PROVIDED HISTORY: fall TECHNOLOGIST PROVIDED HISTORY: fall Reason for Exam: fall, hip pain FINDINGS: Three views of the right hip demonstrate a minimally displaced, impaction type fracture involving the subcapital portion of the right femoral neck. Mild varus deformity is present. Intertrochanteric region is intact. Pubic rami are intact. Five views of the right femur again demonstrate the impacted right subcapital femoral neck fracture. Remainder of the femur is intact. Three views of the right knee demonstrate tricompartment degenerative changes, without evidence of an acute fracture. No joint effusion is present. 1. Impacted, minimally displaced right subcapital femoral neck fracture, with mild varus deformity 2. Tricompartment degenerative change involving the knee joint, without evidence of an acute fracture     XR CHEST PORTABLE    Result Date: 12/7/2021  EXAMINATION: ONE XRAY VIEW OF THE CHEST 12/7/2021 9:21 pm COMPARISON: None.  HISTORY: ORDERING SYSTEM PROVIDED HISTORY: Pre-op screening TECHNOLOGIST PROVIDED HISTORY: Pre-op screening Reason for Exam: upr,pre  op,hip fx,no chest complaints FINDINGS: The cardiomediastinal silhouette is mildly enlarged in size and contour. Mild perihilar markings can be seen with interstitial edema vascular congestion versus mild interstitial pneumonia. .  No pleural effusion or pneumothorax is present. Increased perihilar interstitial markings can be seen with vascular congestion versus interstitial edema versus interstitial pneumonia. PHYSICAL EXAM:   GCS:  4 - Opens eyes on own   6 - Follows simple motor commands  5 - Alert and oriented    Pupil size:  Left 4 mm Right 4 mm  Pupil reaction: Yes  Wiggles fingers: Left Yes Right Yes  Hand grasp:   Left normal   Right normal  Wiggles toes: Left Yes    Right Yes  Plantar flexion: Left normal  Right normal    BP (!) 145/132   Pulse 84   Temp 96.8 °F (36 °C) (Temporal)   Resp 16   Ht 5' 4\" (1.626 m)   Wt 151 lb (68.5 kg)   SpO2 96%   BMI 25.92 kg/m²   General appearance: alert, appears stated age and cooperative  Head: Normocephalic, without obvious abnormality, atraumatic  Eyes: conjunctivae/corneas clear. PERRL, EOM's intact. Fundi benign. Ears: normal TM's and external ear canals both ears  Nose: Nares normal. Septum midline. Mucosa normal. No drainage or sinus tenderness. Throat: lips, mucosa, and tongue normal; teeth and gums normal  Neck: no JVD and supple, symmetrical, trachea midline  Back: symmetric, no curvature. ROM normal. No CVA tenderness.   Lungs: equal chest rise and fall, no increased wob  Heart: regular rate and rhythm and S1, S2 normal  Abdomen: soft, non-tender; bowel sounds normal; no masses,  no organomegaly  Extremities: bruising and swelling to right hip and flank, mild dependent edema BLE  Pulses: 2+ and symmetric  Skin: ecchymoses - arm(s) right, buttock(s) right, hip(s) right, knee(s) right  Neurologic: Grossly normal    Spine:     Spine Tenderness ROM   Cervical 0 /10 Normal   Thoracic 0 /10 Normal Lumbar 0 /10 Normal     Musculoskeletal    Joint Tenderness Swelling ROM   Right shoulder absent absent normal   Left shoulder absent absent normal   Right elbow absent absent normal   Left elbow absent absent normal   Right wrist absent absent normal   Left wrist absent absent normal   Right hand grasp absent absent normal   Left hand grasp absent absent normal   Right hip present present abnormal - limited due to pain   Left hip absent absent normal   Right knee absent absent abnormal - reduced due to pain   Left knee absent absent normal   Right ankle absent absent normal   Left ankle absent absent normal   Right foot absent absent normal   Left foot absent absent normal           CONSULTS: Orthopedics    PROCEDURES: Plan for OR fix of R hip    INJURIES:  R femur subcapital fx      Patient Active Problem List   Diagnosis    [facility], [facility] III (moderate) [facility]    Essential hypertension    Type 2 diabetes mellitus without complication (HCC)    Microalbuminuria    Hypovitaminosis D    Closed subcapital fracture of femur, right, initial encounter ([facility]         Assessment/Plan:     No additional injuries noted on exam, no additional imaging at this time

## 2021-12-08 NOTE — PROGRESS NOTES
NSQIP calculated and patient with above average risk for cardiac complications, UTI, cardiac comp,lication, PNA, and death based on health factors and procedure indicated for repair of Right femoral supcapital fracture. Labs and EKG obtained.      Hannah Dumont DO  8:37 PM  12/07/21

## 2021-12-08 NOTE — PROGRESS NOTES
PROGRESS NOTE    PATIENT NAME: Brent Whitten  MEDICAL RECORD NO. [id number]  DATE: 12/8/2021  SURGEON: Anton Quiñonez  PRIMARY CARE PHYSICIAN: Jennifer Ashton MD    HD: # 1    ASSESSMENT  Patient Active Problem List   Diagnosis    [facility], [facility]    Essential hypertension    Type 2 diabetes mellitus without complication ([facility]    Microalbuminuria    Hypovitaminosis D    Closed subcapital fracture of femur, right, initial encounter ([facility]     New diagnoses:    PLAN  1. Ortho plans ORIF today     SUBJECTIVE  Patient is doing well. Pain is controlled. she is tolerating a Diet NPO Exceptions are: Sips of Water with Meds diet. Patient is tolerating strict bedrest with HOB flat-to-30 degrees X 24 hours, then check with Sx. Patient is passing flatus and has had a bowel movement. Patient denies nausea or vomiting.      OBJECTIVE  VITALS   Patient Vitals for the past 24 hrs:   BP Temp Temp src Pulse Resp SpO2 Height Weight   12/08/21 0905 (!) 145/132 96.8 °F (36 °C) Temporal 84 16 96 % 5' 4\" (1.626 m) 151 lb (68.5 kg)   12/08/21 0852 (!) 149/68 -- -- 71 14 -- -- --   12/08/21 0820 -- -- -- -- -- 92 % -- --   12/08/21 0633 (!) 158/72 -- -- 75 14 90 % -- --   12/08/21 0532 (!) 150/117 -- -- 82 17 92 % -- --   12/08/21 0432 (!) 145/69 -- -- 68 13 (!) 89 % -- --   12/08/21 0418 -- 97.7 °F (36.5 °C) Oral -- -- -- -- --   12/08/21 0416 (!) 151/71 -- -- 71 13 (!) 89 % -- --   12/08/21 0332 (!) 141/69 -- -- 71 14 90 % -- --   12/08/21 0316 (!) 168/70 -- -- 78 13 90 % -- --   12/08/21 0305 129/67 -- -- 74 14 90 % -- --   12/08/21 0232 (!) 154/67 -- -- 77 16 90 % -- --   12/08/21 0216 (!) 161/68 -- -- 79 18 91 % -- --   12/08/21 0215 (!) 146/83 -- -- 77 14 90 % -- --   12/08/21 0115 (!) 150/135 -- -- 87 17 92 % -- --   12/08/21 0050 (!) 151/74 -- -- 79 15 90 % -- --   12/07/21 2350 (!) 168/81 -- -- 87 18 92 % -- --   12/07/21 2250 131/63 -- -- 92 16 93 % -- --   12/07/21 2150 -- -- -- 77 14 90 % -- --   12/07/21 2050 -- -- -- 84 21 92 % -- --   12/07/21 2003 (!) 156/72 -- -- 85 17 90 % -- --   12/07/21 1903 (!) 153/68 -- -- 94 16 94 % -- --   12/07/21 1842 (!) 160/72 -- -- -- 18 91 % -- --   12/07/21 1746 -- -- -- 81 17 94 % -- --   12/07/21 1742 (!) 174/72 -- -- 79 19 93 % -- --   12/07/21 1556 (!) 164/69 97.4 °F (36.3 °C) Axillary 77 16 95 % 5' 4\" (1.626 m) 151 lb (68.5 kg)     GENERAL: alert  NEUROLOGIC: alert, oriented, normal speech, no focal findings or movement disorder noted  LUNGS: clear to auscultation bilaterally- no wheezes, rales or rhonchi, normal air movement, no respiratory distress  HEART: normal rate, normal S1 and S2, no gallops, intact distal pulses and no carotid bruits  ABDOMEN: soft, non-tender, non-distended, normal bowel sounds, no masses or organomegaly  WOUNDS:   EXTREMITY: no cyanosis and no clubbing    24 HR INTAKE/OUTPUT: No intake or output data in the 24 hours ending 12/08/21 1142    Chest X-Ray: See radiology report    LABS:  CBC:   Recent Labs     12/07/21 1900 12/07/21 1901 73/76/81  3837   WBC DUPLICATE ORDER 6.4 7.0   HGB DUPLICATE ORDER 36.2 04.1   HCT DUPLICATE ORDER 54.2 38.5*   MCV DUPLICATE ORDER 78.5 21.5   PLT DUPLICATE ORDER [id number]     BMP: [email or web address]  COAGS:   Recent Labs     12/07/21 1900 12/07/21 1901 12/07/21  2348   APTT 27.1  --  25.8   PROT  --  8.2  --    INR 1.1  --  1.2     PANCREAS:  No results for input(s): LIPASE, AMYLASE in the last 72 hours.   LIVER:   Recent Labs     12/07/21 1901   AST 65*   ALT 24   BILIDIR 0.40*   BILITOT 1.15   ALKPHOS 170*     CBC:   Lab Results   Component Value Date    WBC 7.0 12/08/2021    RBC 3.71 12/08/2021    HGB 12.2 12/08/2021    HCT 35.8 12/08/2021    MCV 96.5 12/08/2021    MCH 32.9 12/08/2021    MCHC 34.1 12/08/2021    RDW 13.8 12/08/2021     12/08/2021    MPV 9.5 12/08/2021     BMP:    Lab Results   Component Value Date     12/08/2021    K 3.1 12/08/2021    CL 99 12/08/2021    CO2 22 12/08/2021    BUN 14 12/08/2021    LABALBU 3.8 12/07/2021    LABALBU 4.2 11/06/2019    CREATININE 0.69 12/08/2021    CALCIUM 9.6 12/08/2021    GFRAA >60 12/08/2021    LABGLOM >60 12/08/2021    GLUCOSE 136 12/08/2021       Monique Echeverria MD  12/8/21, 11:42 AM

## 2021-12-08 NOTE — ED NOTES
Dr [facility] spoke to pt about remaining full code during the surgery tomorrow to reduce right hip fx. Pt was agreeable and stated what was expected. Pt is alert and oriented and understands that she will become DNR-CCA post surgery. Writer witness to conversation.         Latasha Rios RN  12/07/21 0546

## 2021-12-08 NOTE — PROGRESS NOTES
Notified Dr. Tadeo Tanner, patient just arrived from surgery. Her potassium was 3.1 this morning. There were mulitple meds ordered - a D 5 % and 0.45 % with KCL 20 MEQ continuous infusion, potassium 40 MEQ oral, and potassium IV (which are now  orders). What do you want this patient to receive for potassium replacement? Per Dr. Tadeo Tanner, patient needs repeat labs and labs ordered by physician. Called phlebotomy to notify them of new lab orders.

## 2021-12-08 NOTE — CARE COORDINATION
Transitional planning:  Nurse approaches NCM and states granddaughter is asking for SNF choice list.   José Manuel Lowe, SNF choice list, explained therapy offered and rating system. She will return tomorrow with choices. Pt too groggy to completed [facility] initial assessment.

## 2021-12-08 NOTE — ED NOTES
Pt. Updated on new room assignment. Pt updated on likely time she will be going to surgery. Pt. Is resting in bed, eyes open, a&ox4.  Respirations nonlabored and even     Delorise JEYSON Norton  12/08/21 9378

## 2021-12-08 NOTE — PROGRESS NOTES
Trauma Tertiary Survey    Admit Date: 12/7/2021  Hospital day 0    [facility]       Past Medical History:   Diagnosis Date    Anxiety     Cancer [facility])     Chronic kidney disease     DM (diabetes mellitus) ([facility]     Hypertension     Liver disease     Osteoarthritis        Scheduled Meds:   Continuous Infusions:   PRN Meds:fentanNYL, morphine    Subjective:     Patient was evaluated at bedside. No acute changes since admission. Patient states she is having right sided hip pain but otherwise is not in any pain anywhere else. She denies chest pain, shortness of breath, abdominal pain, n/v/d, loss of strength or sensation    Objective:     Patient Vitals for the past 8 hrs:   BP Temp Temp src Pulse Resp SpO2 Height Weight   12/07/21 1842 (!) 160/72 -- -- -- 18 91 % -- --   12/07/21 1746 -- -- -- 81 17 94 % -- --   12/07/21 1742 (!) 174/72 -- -- 79 19 93 % -- --   12/07/21 1556 (!) 164/69 97.4 °F (36.3 °C) Axillary 77 16 95 % 5' 4\" (1.626 m) 151 lb (68.5 kg)       No intake/output data recorded. No intake/output data recorded. Radiology:  XR HIP RIGHT (2-3 VIEWS)    Result Date: 12/7/2021  1. Impacted, minimally displaced right subcapital femoral neck fracture, with mild varus deformity 2. Tricompartment degenerative change involving the knee joint, without evidence of an acute fracture     XR FEMUR RIGHT (MIN 2 VIEWS)    Result Date: 12/7/2021  1. Impacted, minimally displaced right subcapital femoral neck fracture, with mild varus deformity 2. Tricompartment degenerative change involving the knee joint, without evidence of an acute fracture     XR KNEE RIGHT (3 VIEWS)    Result Date: 12/7/2021  1. Impacted, minimally displaced right subcapital femoral neck fracture, with mild varus deformity 2.  Tricompartment degenerative change involving the knee joint, without evidence of an acute fracture       PHYSICAL EXAM:   GCS:  4 - Opens eyes on own   6 - Follows simple motor commands  5 - Alert and oriented    Pupil size: Left 2 mm Right 2 mm  Pupil reaction: Yes  Wiggles fingers: Left Yes Right Yes  Hand grasp:   Left normal   Right normal  Wiggles toes: Left Yes    Right Yes  Plantar flexion: Left normal  Right normal    Physical Exam  Constitutional:       Appearance: Normal appearance. She is normal weight. HENT:      Head: Normocephalic and atraumatic. Right Ear: External ear normal.      Left Ear: External ear normal.      Nose: Nose normal.      Mouth/Throat:      Mouth: Mucous membranes are moist.      Pharynx: Oropharynx is clear. Eyes:      Extraocular Movements: Extraocular movements intact. Conjunctiva/sclera: Conjunctivae normal.      Pupils: Pupils are equal, round, and reactive to light. Cardiovascular:      Rate and Rhythm: Normal rate and regular rhythm. Pulses: Normal pulses. Pulmonary:      Effort: Pulmonary effort is normal. No respiratory distress. Breath sounds: No stridor. Abdominal:      General: There is distension (  baseline from mild ascites  ). Palpations: Abdomen is soft. There is no mass. Tenderness: There is no abdominal tenderness. There is no guarding or rebound. Musculoskeletal:         General: Swelling (  right hip) and tenderness (right hip) present. Cervical back: Normal range of motion and neck supple. No rigidity or tenderness. Skin:     General: Skin is warm and dry. Capillary Refill: Capillary refill takes less than 2 seconds. Neurological:      Mental Status: She is alert and oriented to person, place, and time. Mental status is at baseline. Sensory: No sensory deficit. Motor: No weakness.    Psychiatric:         Mood and Affect: Mood normal.         Behavior: Behavior normal.        Spine:     Spine Tenderness ROM   Cervical 0 /10 Normal   Thoracic 0 /10 Normal   Lumbar 0 /10 Normal     Musculoskeletal    Joint Tenderness Swelling ROM   Right shoulder absent absent normal   Left shoulder absent absent normal   Right elbow absent absent normal   Left elbow absent absent normal   Right wrist absent absent normal   Left wrist absent absent normal   Right hand grasp absent absent normal   Left hand grasp absent absent normal   Right hip present present Abnormal all planes   Left hip absent absent normal   Right knee absent absent normal   Left knee absent absent normal   Right ankle absent absent normal   Left ankle absent absent normal   Right foot absent absent normal   Left foot absent absent normal      CONSULTS: ortho    PROCEDURES: pending ortho surg    INJURIES:  R subcapital fx      Patient Active Problem List   Diagnosis    Chronic kidney disease, stage III (moderate) ([facility])    Essential hypertension    Type 2 diabetes mellitus without complication ([facility])    Microalbuminuria    Hypovitaminosis D    Closed subcapital fracture of femur, right, initial encounter ([facility])         Assessment/Plan:   TERT negative for new injuries    Right femoral subcapital fracture status post fall standing height  Admit to [facility], plans for operative fix by orthopedics  Multimodal pain control, avoid NSAIDs due to renal and liver history  Patient DNR CCA but agreeable to surgery  N.p.o. at midnight  Start insulin high-dose sliding scale  Resume home blood pressure medications  Aggressive incentive spirometry  Monitor intake and output, may need Mccain catheter if concern for volume status  Obtain BMP now and daily  Obtain LFT for baseline given liver history, obtain PT/INR  Perioperative antibiotics per Ortho  DVT prophylaxis with heparin 5000 units every 8 hours  We will need PT, OT, placement        Attending Note  I have reviewed the above TECSS note(s) and I either performed the key elements of the medical history and physical exam or was present with the resident when the key elements of the medical history and physical exam were performed.  I have discussed the findings, established the care plan and recommendations with Resident, KARIN RN, bedside nurse.     Yamini Pang MD  12/7/2021  9:48 PM

## 2021-12-08 NOTE — ED PROVIDER NOTES
[facility] [address] [facility]  Emergency Department  Emergency Medicine Resident Sign-out     Care of Job [place] was assumed from Dr. Marguerite Tate and is being seen for Fall (possible hip fx )  . The patient's initial evaluation and plan have been discussed with the prior provider who initially evaluated the patient. EMERGENCY DEPARTMENT COURSE / MEDICAL DECISION MAKING:       MEDICATIONS GIVEN:  Orders Placed This Encounter   Medications    fentaNYL (SUBLIMAZE) injection 50 mcg    morphine injection 4 mg    morphine injection 4 mg       LABS / RADIOLOGY:     Labs Reviewed   COVID-19, RAPID   TRAUMA PANEL   CBC WITH AUTO DIFFERENTIAL   BASIC METABOLIC PANEL W/ REFLEX TO MG FOR LOW K   HEPATIC FUNCTION PANEL   PROTIME-INR       XR HIP RIGHT (2-3 VIEWS)    Result Date: 12/7/2021  EXAMINATION: TWO XRAY VIEWS OF THE RIGHT HIP; 5  XRAY VIEWS OF THE RIGHT FEMUR; THREE XRAY VIEWS OF THE RIGHT KNEE 12/7/2021 11:10 am; 12/7/2021 11:40 am COMPARISON: None. HISTORY: ORDERING SYSTEM PROVIDED HISTORY: fall, hip pain TECHNOLOGIST PROVIDED HISTORY: fall, hip pain Reason for Exam: fall, hip pain; ORDERING SYSTEM PROVIDED HISTORY: fall TECHNOLOGIST PROVIDED HISTORY: fall Reason for Exam: fall, hip pain FINDINGS: Three views of the right hip demonstrate a minimally displaced, impaction type fracture involving the subcapital portion of the right femoral neck. Mild varus deformity is present. Intertrochanteric region is intact. Pubic rami are intact. Five views of the right femur again demonstrate the impacted right subcapital femoral neck fracture. Remainder of the femur is intact. Three views of the right knee demonstrate tricompartment degenerative changes, without evidence of an acute fracture. No joint effusion is present. 1. Impacted, minimally displaced right subcapital femoral neck fracture, with mild varus deformity 2.  Tricompartment degenerative change involving the knee joint, without evidence of an acute Five views of the right femur again demonstrate the impacted right subcapital femoral neck fracture. Remainder of the femur is intact. Three views of the right knee demonstrate tricompartment degenerative changes, without evidence of an acute fracture. No joint effusion is present. 1. Impacted, minimally displaced right subcapital femoral neck fracture, with mild varus deformity 2. Tricompartment degenerative change involving the knee joint, without evidence of an acute fracture       RECENT VITALS:     Temp: 97.4 °F (36.3 °C),  Pulse: 81, Resp: 18, BP: (!) 160/72, SpO2: 91 %    This patient is a 80 y.o. Female with hypertension, diabetes, broke her hip after a fall. Ortho plans for surgery in the morning, trauma surgery aware, plan for trauma surgery admission. OUTSTANDING TASKS / RECOMMENDATIONS:    1. Awaiting trauma admit     FINAL IMPRESSION:     1. Closed fracture of neck of right femur, initial encounter ([facility] [address]    2. Fall, initial encounter        DISPOSITION:         DISPOSITION:  []  Discharge   []  Transfer to [address] [place] [id number]   []  Transfer -      [x]  Admission -  Trauma    []  Admission to Internal Medicine   []  Admission to Intermed   []  Admission to Obs   []  Against Medical Advice   []  Eloped   FOLLOW-UP: No follow-up provider specified.    DISCHARGE MEDICATIONS: New Prescriptions    No medications on file           Nimo Valdes MD  Emergency Medicine Resident  [address] Nimo Valdes MD  Resident  12/07/21 7450

## 2021-12-08 NOTE — PROGRESS NOTES
PROGRESS NOTE          PATIENT NAME: Carol [facility] RECORD NO. [id number]  DATE: 2021  SURGEON: Dr. Conrad Reas: Micheal Fay MD    HD: # 1    ASSESSMENT    Patient Active Problem List   Diagnosis    Chronic kidney disease, stage III (moderate) ([facility] [address]    Essential hypertension    Type 2 diabetes mellitus without complication (HCC)    Microalbuminuria    Hypovitaminosis D    Closed subcapital fracture of femur, right, initial encounter [facility])       MEDICAL DECISION MAKING AND PLAN    OR today for right subcapital femur fracture  Continue n.p.o. with IV hydration  Multimodal pain control  Will need postop  History cirrhosis  LFT with mild elevation of transaminase enzymes, preserved INR and platelet count  Normal ammonia level this morning   Resume home meds  CKD 3  Avoid nephrotoxic agents  Monitor intake and output  DVT Prophylaxis-Lovenox when cleared by orthopedics  Patient will need facility for discharge, discussed this with patient, will see weightbearing status postop and physical therapy recommendations      Chief Complaint: \" I am still here\"    Emerita Miranda is is unchanged since yesterday. Was able to get some sleep overnight, pain controlled when resting, n.p.o., voiding per bedpan but no record of output, afebrile vital signs stable.       OBJECTIVE  VITALS: Temp: Temp: 96.8 °F (36 °C)Temp  Av.3 °F (36.3 °C)  Min: 96.8 °F (36 °C)  Max: 97.7 °F (16.4 °C) BP Systolic (38TVV), ZOV:412 , Min:129 , RNT:762   Diastolic (72PLI), RTK:14, Min:63, Max:135   Pulse Pulse  Av.5  Min: 68  Max: 94 Resp Resp  Avg: 15.8  Min: 13  Max: 21 Pulse ox SpO2  Av.5 %  Min: 89 %  Max: 96 %  GENERAL: alert, no distress  NEURO: FUENTES, no gross deficits  HEENT: NCAT  : deferred  LUNGS: Equal chest rise and fall, no increased work of breathing  HEART: normal rate and regular rhythm  ABDOMEN: soft, non-tender, non-distended, bowel sounds present in all 4 quadrants and no guarding or peritoneal signs present  EXTREMITY: no cyanosis, clubbing, bruising to right hip and buttock with some extension to right flank, swelling and tenderness over right hip    No intake/output data recorded. Drain/tube output:  No intake/output data recorded. LAB:  CBC:   Recent Labs     12/07/21 1900 12/07/21 1901 79/08/19  5611   WBC DUPLICATE ORDER 6.4 7.0   HGB DUPLICATE ORDER 62.9 92.1   HCT DUPLICATE ORDER 48.5 21.0*   MCV DUPLICATE ORDER 42.7 73.4   PLT DUPLICATE ORDER 443 908     BMP:   Recent Labs     12/07/21  1900 12/07/21  1900 12/07/21  1901 12/08/21  0037 12/08/21  0345 12/08/21  0541 81/93/79  0513   NA DUPLICATE ORDER  --  885  --   --  572  --    K DUPLICATE ORDER  --  3.7  --   --  3.1*  --    CL DUPLICATE ORDER  --  99  --   --  99  --    CO2 DUPLICATE ORDER  --  19*  --   --  22  --    BUN DUPLICATE ORDER  --  15  --   --  14  --    CREATININE DUPLICATE ORDER  --  3.10  --   --  0.69  --    GLUCOSE DUPLICATE ORDER   < > 095*   < > 232 160* 136    < > = values in this interval not displayed. COAGS:   Recent Labs     12/07/21  1900 12/07/21 1901 12/07/21  2348   APTT 27.1  --  25.8   PROT  --  8.2  --    INR 1.1  --  1.2       RADIOLOGY:  XR HIP RIGHT (2-3 VIEWS)    Result Date: 12/7/2021  1. Impacted, minimally displaced right subcapital femoral neck fracture, with mild varus deformity 2. Tricompartment degenerative change involving the knee joint, without evidence of an acute fracture     XR FEMUR RIGHT (MIN 2 VIEWS)    Result Date: 12/7/2021  1. Impacted, minimally displaced right subcapital femoral neck fracture, with mild varus deformity 2. Tricompartment degenerative change involving the knee joint, without evidence of an acute fracture     XR KNEE RIGHT (3 VIEWS)    Result Date: 12/7/2021  1. Impacted, minimally displaced right subcapital femoral neck fracture, with mild varus deformity 2.  Tricompartment degenerative change involving the knee joint, without evidence of an acute fracture     XR CHEST PORTABLE    Result Date: 12/7/2021  Increased perihilar interstitial markings can be seen with vascular congestion versus interstitial edema versus interstitial pneumonia. Zaid Andres DO  12/8/21, 12:02 PM        Attending Note      I have reviewed the above TECSS note(s) and I either performed the key elements of the medical history and physical exam or was present with the resident when the key elements of the medical history and physical exam were performed. I have discussed the findings, established the care plan and recommendations with Resident, TECSS RN, bedside nurse.     El Rosa MD  12/8/2021  12:07 PM

## 2021-12-08 NOTE — CONSULTS
Orthopaedic Surgery Consult  (Dr. Marye Gitelman)      CC/Reason for consult:  Right hip pain / Right femoral neck fx    HPI:      The patient is a 80 y.o. female who fell from standing height and sustained a right femoral neck fracture. Patient states she was reaching down to pick something up with the assistance of her cane when her cane slipped out from underneath her and she fell. She states that she immediately felt right hip pain and was unable to get up. She denies hitting her head or losing consciousness. She denies pain in any of her other extremities. She denies any changes in sensation to her right lower extremity as well as her other extremities. She denies any blood thinner use. She ambulates with a rolling walker usually. She has a history of liver disease and GI/variceal bleeds, but denies any cardiac or pulmonary medical history. She has a history of diabetes and hypertension. Past Medical History:    Past Medical History:   Diagnosis Date    Anxiety     Cancer ([facility] [address]     Chronic kidney disease     DM (diabetes mellitus) ([facility] [address]     Hypertension     Liver disease     Osteoarthritis      Past Surgical History:    Past Surgical History:   Procedure Laterality Date    BREAST LUMPECTOMY      right    LYMPH NODE DISSECTION      nose     Medications Prior to Admission:   Prior to Admission medications    Medication Sig Start Date End Date Taking? Authorizing Provider   acetaminophen-codeine (TYLENOL #3) 300-30 MG per tablet Take 1 tablet by mouth every 4 hours as needed for Pain    Historical Provider, MD   ALPRAZolam (XANAX) 0.5 MG tablet Take 0.5 mg by mouth nightly as needed for Sleep.      Historical Provider, MD   amLODIPine (NORVASC) 10 MG tablet Take 10 mg by mouth daily    Historical Provider, MD   hydrochlorothiazide (HYDRODIURIL) 25 MG tablet Take 25 mg by mouth daily    Historical Provider, MD   metFORMIN (GLUCOPHAGE) 500 MG tablet Take 500 mg by mouth 2 times daily (with meals) Historical Provider, MD   metoprolol (LOPRESSOR) 50 MG tablet Take 50 mg by mouth daily    Historical Provider, MD     Allergies:    Latex, Keflex [cephalexin], and Penicillins  Social History:   Social History     Socioeconomic History    Marital status:      Spouse name: None    Number of children: None    Years of education: None    Highest education level: None   Occupational History    None   Tobacco Use    Smoking status: Never Smoker    Smokeless tobacco: Never Used   Substance and Sexual Activity    Alcohol use: No     Alcohol/week: 0.0 standard drinks    Drug use: No    Sexual activity: None   Other Topics Concern    None   Social History Narrative    None     Social Determinants of Health     Financial Resource Strain:     Difficulty of Paying Living Expenses: Not on file   Food Insecurity:     Worried About Running Out of Food in the Last Year: Not on file    Joi of Food in the Last Year: Not on file   Transportation Needs:     Lack of Transportation (Medical): Not on file    Lack of Transportation (Non-Medical):  Not on file   Physical Activity:     Days of Exercise per Week: Not on file    Minutes of Exercise per Session: Not on file   Stress:     Feeling of Stress : Not on file   Social Connections:     Frequency of Communication with Friends and Family: Not on file    Frequency of Social Gatherings with Friends and Family: Not on file    Attends Buddhism Services: Not on file    Active Member of Clubs or Organizations: Not on file    Attends Club or Organization Meetings: Not on file    Marital Status: Not on file   Intimate Partner Violence:     Fear of Current or Ex-Partner: Not on file    Emotionally Abused: Not on file    Physically Abused: Not on file    Sexually Abused: Not on file   Housing Stability:     Unable to Pay for Housing in the Last Year: Not on file    Number of [place] in the Last Year: Not on file    Unstable Housing in the Last Year: Not on file     Family History:  Family History   Family history unknown: Yes       ROS:   Constitutional: Negative for fever and chills. Respiratory: Negative for cough. Cardiovascular: Negative for chest pain. Musculoskeletal: Positive for right hip pain. Skin: Negative for itching and rash. Neurological: Negative for numbness, tingling, weakness. PE:  Blood pressure (!) 156/72, pulse 85, temperature 97.4 °F (36.3 °C), temperature source Axillary, resp. rate 17, height 5' 4\" (1.626 m), weight 151 lb (68.5 kg), SpO2 90 %. Gen: Alert and oriented, NAD, cooperative. Head: Normocephalic, atraumatic. Cardiovascular: Rate regular. Respiratory: Chest symmetric, no accessory muscle use. Pelvis: Stable to anterior and lateral compression. RUE: Skin intact. No ecchymoses, abrasion, deformity, or lacerations. Non tender to palpation. No crepitus. Compartments soft and easily compressible. Full range of motion at shoulder and elbow without pain. Ulnar/median/AIN/PIN/radial motor intact. Axillary/MCN/median/ulnar/radial nerves SILT. Radial pulse 2+ with BCR.    LUE: Skin intact. No ecchymoses, abrasion, deformity, or lacerations. Non tender to palpation. No crepitus. Compartments soft and easily compressible. Full range of motion at shoulder and elbow without pain. Ulnar/median/AIN/PIN/radial motor intact. Axillary/MCN/median/ulnar/radial nerves SILT. Radial pulse 2+ with BCR. RLE: Skin intact. Tender palpation over the greater trochanter. Compartments soft and easily compressible. EHL/FHL/TA/GS complex motor intact. Sural/saphenous/SPN/DPN/plantar nerve distribution SILT. Lachman negative, knee appears stable to varus and valgus stress test at 0 and 30 degrees. DP pulse 2+ with BCR. LLE: Skin intact. No ecchymoses, abrasions, deformity, or lacerations. Non tender to palpation. No crepitus. Compartments soft and easily compressible. EHL/FHL/TA/GS complex motor intact.

## 2021-12-08 NOTE — BRIEF OP NOTE
Brief Postoperative Note      Patient: Swapnil Stephens  YOB: 1929  MRN: [id number]   CSN: [id number]     Date of Procedure: 12/8/2021    Pre-Op Diagnosis: Right Femoral Neck Fracture    Post-Op Diagnosis: Same       Procedure:   Right hip hemiarthroplasty (76286)    Surgeon(s):  Huma Steve DO    Assistant:  Resident: Pam Bradley DO; Raymond Borja DO    Anesthesia: General    Estimated Blood Loss (mL): 150    Fluids: 400 cc crystalloid    Complications: None    Specimens:   * No specimens in log *    Implants:  S&N 14 synergy unipolar stem  Implant Name Type Inv. Item Serial No.  Lot No. LRB No. Used Action   STEM FEM SZ 14 L150MM HIP CO CHROM STD OFFSET CEMENTLESS  STEM FEM SZ 14 L150MM HIP CO CHROM STD OFFSET CEMENTLESS  [facility] [employer]- [id number] Right 1 Implanted   SLEEVE FEM +0MM OFFSET 12/14 TAPR CO CHROM UPLR TNDM  SLEEVE FEM +0MM OFFSET 12/14 TAPR CO CHROM UPLR TNDM  [employer]- [id number] Right 1 Implanted   HEAD UPLR [id number] HIP CO CHROM FEM TNDM  HEAD UPLR [id number] HIP CO CHROM FEM TNDM  [employer] [place] [id number] Right 1 Implanted         Drains:   Urethral Catheter Non-latex 16 fr (Active)       Findings: Right femoral neck fracture. See op note for details.      Electronically signed by Huma Steve DO on 12/8/2021 at 2:35 PM

## 2021-12-08 NOTE — ANESTHESIA PRE PROCEDURE
Provider, MD   metoprolol (LOPRESSOR) 50 MG tablet Take 50 mg by mouth daily    Historical Provider, MD       Current medications:    Current Facility-Administered Medications   Medication Dose Route Frequency Provider Last Rate Last Admin    potassium chloride (KLOR-CON M) extended release tablet 40 mEq  40 mEq Oral Once Derenda Poland, DO        potassium chloride 10 mEq/100 mL IVPB (Peripheral Line)  10 mEq IntraVENous Q1H Derenda Poland, DO        levothyroxine (SYNTHROID) tablet 25 mcg  25 mcg Oral Daily Derenda Poland, DO        pantoprazole (PROTONIX) tablet 40 mg  40 mg Oral Daily Derenda Poland, DO        propranolol (INDERAL) tablet 10 mg  10 mg Oral BID Derenda Poland, DO        fentaNYL (SUBLIMAZE) injection 50 mcg  50 mcg IntraVENous Q1H PRN Carmen Lowe MD   50 mcg at 12/07/21 2228    amLODIPine (NORVASC) tablet 10 mg  10 mg Oral Daily Derenda Poland, DO        hydroCHLOROthiazide (HYDRODIURIL) tablet 25 mg  25 mg Oral Daily Derenda Poland, DO        metoprolol tartrate (LOPRESSOR) tablet 50 mg  50 mg Oral Daily Derenda Poland, DO        sodium chloride flush 0.9 % injection 5-40 mL  5-40 mL IntraVENous PRN Derenda Poland, DO        ondansetron (ZOFRAN-ODT) disintegrating tablet 4 mg  4 mg Oral Q8H PRN Derenda Poland, DO        Or    ondansetron [facility]) injection 4 mg  4 mg IntraVENous Q6H PRN Derenda Poland, DO        polyethylene glycol (GLYCOLAX) packet 17 g  17 g Oral Daily Derenda Poland, DO        bisacodyl (DULCOLAX) suppository 10 mg  10 mg Rectal Daily Derenda Poland, DO        sodium chloride flush 0.9 % injection 5-40 mL  5-40 mL IntraVENous PRN Derenda Poland, DO        dextrose 5 % and 0.45 % NaCl with KCl 20 mEq infusion   IntraVENous Continuous Derenda Poland,  mL/hr at 12/08/21 0114 New Bag at 12/08/21 0114    acetaminophen (TYLENOL) tablet 1,000 mg  1,000 mg Oral 3 times per day Derenda Poland, DO   1,000 mg at 12/08/21 0637    oxyCODONE (ROXICODONE) immediate release tablet 2.5 mg  2.5 mg Oral Q6H PRN [facility], DO   2.5 mg at 12/08/21 0350    methocarbamol (ROBAXIN) tablet 750 mg  750 mg Oral Q6H [facility], DO   750 mg at 12/08/21 5976    gabapentin (NEURONTIN) capsule 300 mg  300 mg Oral Q8H [facility], DO   300 mg at 12/08/21 0027    sennosides-docusate sodium (SENOKOT-S) 8.6-50 MG tablet 1 tablet  1 tablet Oral BID [facility], DO   1 tablet at 12/08/21 0027    dextrose 50 % IV solution  12.5 g IntraVENous PRN [facility], DO        dextrose 5 % solution  100 mL/hr IntraVENous PRN [facility], DO        insulin lispro (HUMALOG) injection vial 0-18 Units  0-18 Units SubCUTAneous Q4H [facility], DO   6 Units at 12/08/21 0351     Current Outpatient Medications   Medication Sig Dispense Refill    ferrous sulfate (FE TABS 325) 325 (65 Fe) MG EC tablet Take 325 mg by mouth 2 times daily      levothyroxine (SYNTHROID) 25 MCG tablet Take 25 mcg by mouth daily      loperamide (IMODIUM A-D) 2 MG tablet Take 2 mg by mouth 4 times daily as needed      potassium chloride (KLOR-CON M) 10 MEQ extended release tablet Take 10 mEq by mouth daily      propranolol (INDERAL) 10 MG tablet Take 10 mg by mouth 2 times daily      Cholecalciferol 50 MCG (2000 UT) TABS Take 50 mcg by mouth 2 times daily      pantoprazole (PROTONIX) 40 MG tablet Take 40 mg by mouth daily      valsartan (DIOVAN) 320 MG tablet Take 320 mg by mouth daily      acetaminophen-codeine (TYLENOL #3) 300-30 MG per tablet Take 1 tablet by mouth every 4 hours as needed for Pain      ALPRAZolam (XANAX) 0.5 MG tablet Take 0.5 mg by mouth nightly as needed for Sleep.  amLODIPine (NORVASC) 10 MG tablet Take 10 mg by mouth daily      hydrochlorothiazide (HYDRODIURIL) 25 MG tablet Take 25 mg by mouth daily      metFORMIN (GLUCOPHAGE) 500 MG tablet Take 500 mg by mouth 2 times daily (with meals)      metoprolol (LOPRESSOR) 50 MG tablet Take 50 mg by mouth daily         Allergies:     Allergies Allergen Reactions    Latex     Atorvastatin     Keflex [Cephalexin]     Penicillins     Rosuvastatin        Problem List:    Patient Active Problem List   Diagnosis Code    Chronic kidney disease, stage III (moderate) (HCC) N18.30    Essential hypertension I10    Type 2 diabetes mellitus without complication ([facility] G83.2    Microalbuminuria R80.9    Hypovitaminosis D E55.9    Closed subcapital fracture of femur, right, initial encounter ([facility] S72.011A       Past Medical History:        Diagnosis Date    Anxiety     Cancer ([facility]     Chronic kidney disease     DM (diabetes mellitus) ([facility]     GI bleed     History of blood transfusion     Hypertension     Liver disease     Osteoarthritis        Past Surgical History:        Procedure Laterality Date    BREAST LUMPECTOMY      right    COLONOSCOPY      ENDOSCOPY, COLON, DIAGNOSTIC      x3    LYMPH NODE DISSECTION      nose       Social History:    Social History     Tobacco Use    Smoking status: Never Smoker    Smokeless tobacco: Never Used   Substance Use Topics    Alcohol use: No     Alcohol/week: 0.0 standard drinks                                Counseling given: Not Answered      Vital Signs (Current):   Vitals:    12/08/21 0633 12/08/21 0820 12/08/21 0852 12/08/21 0905   BP: (!) 158/72  (!) 149/68 (!) 145/132   Pulse: 75  71 84   Resp: 14  14 16   Temp:    96.8 °F (36 °C)   TempSrc:    Temporal   SpO2: 90% 92%  96%   Weight:    151 lb (68.5 kg)   Height:    5' 4\" (1.626 m)                                              BP Readings from Last 3 Encounters:   12/08/21 (!) 145/132   10/15/20 132/76   10/10/19 122/80       NPO Status: Time of last liquid consumption: 1400                        Time of last solid consumption: 1400                        Date of last liquid consumption: 12/07/21                        Date of last solid food consumption: 12/07/21    BMI:   Wt Readings from Last 3 Encounters:   12/08/21 151 lb (68.5 kg) 10/15/20 158 lb 3.2 oz (71.8 kg)   10/10/19 157 lb (71.2 kg)     Body mass index is 25.92 kg/m².     CBC:   Lab Results   Component Value Date    WBC 7.0 12/08/2021    RBC 3.71 12/08/2021    HGB 12.2 12/08/2021    HCT 35.8 12/08/2021    MCV 96.5 12/08/2021    RDW 13.8 12/08/2021     12/08/2021       CMP:   Lab Results   Component Value Date     12/08/2021    K 3.1 12/08/2021    CL 99 12/08/2021    CO2 22 12/08/2021    BUN 14 12/08/2021    CREATININE 0.69 12/08/2021    GFRAA >60 12/08/2021    LABGLOM >60 12/08/2021    GLUCOSE 136 12/08/2021    PROT 8.2 12/07/2021    CALCIUM 9.6 12/08/2021    BILITOT 1.15 12/07/2021    ALKPHOS 170 12/07/2021    AST 65 12/07/2021    ALT 24 12/07/2021       POC Tests:   Recent Labs     12/08/21  0742   POCGLU 136*       Coags:   Lab Results   Component Value Date    PROTIME 12.5 12/07/2021    INR 1.2 12/07/2021    APTT 25.8 12/07/2021       HCG (If Applicable): No results found for: PREGTESTUR, PREGSERUM, HCG, HCGQUANT     ABGs: No results found for: PHART, PO2ART, XYJ1SYF, MBY8NDF, BEART, P4XYDWUJ     Type & Screen (If Applicable):  No results found for: LABABO, LABRH    Drug/Infectious Status (If Applicable):  No results found for: HIV, HEPCAB    COVID-19 Screening (If Applicable):   Lab Results   Component Value Date    COVID19 Not Detected 12/07/2021           Anesthesia Evaluation  Patient summary reviewed and Nursing notes reviewed no history of anesthetic complications:   Airway: Mallampati: II  TM distance: >3 FB   Neck ROM: full  Mouth opening: > = 3 FB Dental:          Pulmonary:Negative Pulmonary ROS and normal exam                               Cardiovascular:  Exercise tolerance: poor (<4 METS),   (+) hypertension:,     (-) past MI, CAD, CABG/stent, dysrhythmias,  angina and  CHF    ECG reviewed  Rhythm: regular  Rate: normal           Beta Blocker:  Dose within 24 Hrs         Neuro/Psych:   Negative Neuro/Psych ROS              GI/Hepatic/Renal:   (+) GERD:, PUD, liver disease:,           Endo/Other:    (+) DiabetesType II DM, , electrolyte abnormalities, . Abdominal:             Vascular: Other Findings:             Anesthesia Plan      general     ASA 3       Induction: intravenous. arterial line  MIPS: Postoperative opioids intended and Prophylactic antiemetics administered. Anesthetic plan and risks discussed with patient. Use of blood products discussed with patient whom consented to blood products.    Plan discussed with CRNA and surgical team.                  Merissa Riley MD   12/8/2021

## 2021-12-08 NOTE — PROGRESS NOTES
CTL Spine Evaluation for Spine Clearance:    Pt is a 80 y.o. female who was admitted on 12/7/2021 s/p fall SH w/ right hip fx. Pt w/ complaints of right hip pain. C-Spine precautions of C-collar with spinal neutrality maintained since arrival with current exam directed at further evaluation of spine for clearance purposes. Pt chart reviewed. Patient does not have a distracting injury, is not acutely intoxicated and is alert, oriented and fully able to participate in exam.      Pt denies c-spine pain while resting, full ROM of neck with no midline tenderness. TLS precautions of supine position maintained since arrival.  Pt denies midline pain with palpation of spinous processes. C-spine is considered cleared w/out need for further imaging, evaluation, or continuation of c-collar. TLS considered clear w/out need for further imaging, evaluation, or continuation of supine bedrest precautions.     Electronically signed by Pina Landon DO on 12/7/2021 at 7:18 PM

## 2021-12-08 NOTE — PROGRESS NOTES
Pharmacy Note     Renal Dose Adjustment    Wolf Balbuena is a 80 y.o. female. Pharmacist assessment of renally cleared medications. Recent Labs     12/07/21 1900 05/44/61  6741   BUN DUPLICATE ORDER 15       Recent Labs     12/07/21 1900 12/07/21  1901   CREATININE DUPLICATE ORDER 6.24       Estimated Creatinine Clearance: 52 mL/min (based on SCr of 0.66 mg/dL). Estimated CrCl using Ideal Body Weight: 44.3 mL/min (based on IBW 54.7 kg)    Height:   Ht Readings from Last 1 Encounters:   12/07/21 5' 4\" (1.626 m)     Weight:  Wt Readings from Last 1 Encounters:   12/07/21 151 lb (68.5 kg)       The following medication dose has been adjusted based upon renal function per P&T Guidelines:             Famotidine 20 mg oral twice daily changed to famotidine 20 mg oral once daily.

## 2021-12-09 LAB
ABO/RH: NORMAL
ABSOLUTE EOS #: 0.22 K/UL (ref 0–0.44)
ABSOLUTE IMMATURE GRANULOCYTE: 0.07 K/UL (ref 0–0.3)
ABSOLUTE LYMPH #: 1.09 K/UL (ref 1.1–3.7)
ABSOLUTE MONO #: 1.03 K/UL (ref 0.1–1.2)
ANION GAP SERPL CALCULATED.3IONS-SCNC: 16 MMOL/L (ref 9–17)
ANTIBODY SCREEN: NEGATIVE
ARM BAND NUMBER: NORMAL
BASOPHILS # BLD: 1 % (ref 0–2)
BASOPHILS ABSOLUTE: 0.06 K/UL (ref 0–0.2)
BLD PROD TYP BPU: NORMAL
BLD PROD TYP BPU: NORMAL
BUN BLDV-MCNC: 16 MG/DL (ref 8–23)
BUN/CREAT BLD: ABNORMAL (ref 9–20)
CALCIUM SERPL-MCNC: 8.4 MG/DL (ref 8.6–10.4)
CHLORIDE BLD-SCNC: 96 MMOL/L (ref 98–107)
CO2: 17 MMOL/L (ref 20–31)
CREAT SERPL-MCNC: 1.04 MG/DL (ref 0.5–0.9)
CROSSMATCH RESULT: NORMAL
CROSSMATCH RESULT: NORMAL
DIFFERENTIAL TYPE: ABNORMAL
DISPENSE STATUS BLOOD BANK: NORMAL
DISPENSE STATUS BLOOD BANK: NORMAL
EOSINOPHILS RELATIVE PERCENT: 2 % (ref 1–4)
EXPIRATION DATE: NORMAL
GFR AFRICAN AMERICAN: >60 ML/MIN
GFR NON-AFRICAN AMERICAN: 50 ML/MIN
GFR SERPL CREATININE-BSD FRML MDRD: ABNORMAL ML/MIN/{1.73_M2}
GFR SERPL CREATININE-BSD FRML MDRD: ABNORMAL ML/MIN/{1.73_M2}
GLUCOSE BLD-MCNC: 130 MG/DL (ref 65–105)
GLUCOSE BLD-MCNC: 137 MG/DL (ref 65–105)
GLUCOSE BLD-MCNC: 143 MG/DL (ref 65–105)
GLUCOSE BLD-MCNC: 147 MG/DL (ref 70–99)
GLUCOSE BLD-MCNC: 164 MG/DL (ref 65–105)
GLUCOSE BLD-MCNC: 169 MG/DL (ref 65–105)
GLUCOSE BLD-MCNC: 173 MG/DL (ref 65–105)
GLUCOSE BLD-MCNC: 180 MG/DL (ref 65–105)
HCT VFR BLD CALC: 34.5 % (ref 36.3–47.1)
HEMOGLOBIN: 10.9 G/DL (ref 11.9–15.1)
IMMATURE GRANULOCYTES: 1 %
LYMPHOCYTES # BLD: 10 % (ref 24–43)
MAGNESIUM: 1 MG/DL (ref 1.6–2.6)
MCH RBC QN AUTO: 33.2 PG (ref 25.2–33.5)
MCHC RBC AUTO-ENTMCNC: 31.6 G/DL (ref 28.4–34.8)
MCV RBC AUTO: 105.2 FL (ref 82.6–102.9)
MONOCYTES # BLD: 10 % (ref 3–12)
NRBC AUTOMATED: 0 PER 100 WBC
PDW BLD-RTO: 14.5 % (ref 11.8–14.4)
PHOSPHORUS: 3.8 MG/DL (ref 2.6–4.5)
PLATELET # BLD: 138 K/UL (ref 138–453)
PLATELET ESTIMATE: ABNORMAL
PMV BLD AUTO: 9.8 FL (ref 8.1–13.5)
POTASSIUM SERPL-SCNC: 3.8 MMOL/L (ref 3.7–5.3)
RBC # BLD: 3.28 M/UL (ref 3.95–5.11)
RBC # BLD: ABNORMAL 10*6/UL
SEG NEUTROPHILS: 77 % (ref 36–65)
SEGMENTED NEUTROPHILS ABSOLUTE COUNT: 8.38 K/UL (ref 1.5–8.1)
SODIUM BLD-SCNC: 129 MMOL/L (ref 135–144)
TRANSFUSION STATUS: NORMAL
TRANSFUSION STATUS: NORMAL
UNIT DIVISION: 0
UNIT DIVISION: 0
UNIT NUMBER: NORMAL
UNIT NUMBER: NORMAL
WBC # BLD: 10.9 K/UL (ref 3.5–11.3)
WBC # BLD: ABNORMAL 10*3/UL

## 2021-12-09 PROCEDURE — 83735 ASSAY OF MAGNESIUM: CPT

## 2021-12-09 PROCEDURE — 1200000000 HC SEMI PRIVATE

## 2021-12-09 PROCEDURE — 85025 COMPLETE CBC W/AUTO DIFF WBC: CPT

## 2021-12-09 PROCEDURE — 97535 SELF CARE MNGMENT TRAINING: CPT

## 2021-12-09 PROCEDURE — 6360000002 HC RX W HCPCS: Performed by: STUDENT IN AN ORGANIZED HEALTH CARE EDUCATION/TRAINING PROGRAM

## 2021-12-09 PROCEDURE — 84100 ASSAY OF PHOSPHORUS: CPT

## 2021-12-09 PROCEDURE — 97166 OT EVAL MOD COMPLEX 45 MIN: CPT

## 2021-12-09 PROCEDURE — 6370000000 HC RX 637 (ALT 250 FOR IP): Performed by: STUDENT IN AN ORGANIZED HEALTH CARE EDUCATION/TRAINING PROGRAM

## 2021-12-09 PROCEDURE — 82947 ASSAY GLUCOSE BLOOD QUANT: CPT

## 2021-12-09 PROCEDURE — 80048 BASIC METABOLIC PNL TOTAL CA: CPT

## 2021-12-09 PROCEDURE — 97116 GAIT TRAINING THERAPY: CPT

## 2021-12-09 PROCEDURE — 51798 US URINE CAPACITY MEASURE: CPT

## 2021-12-09 PROCEDURE — 97110 THERAPEUTIC EXERCISES: CPT

## 2021-12-09 PROCEDURE — 36415 COLL VENOUS BLD VENIPUNCTURE: CPT

## 2021-12-09 PROCEDURE — 97162 PT EVAL MOD COMPLEX 30 MIN: CPT

## 2021-12-09 PROCEDURE — 2580000003 HC RX 258: Performed by: EMERGENCY MEDICINE

## 2021-12-09 RX ORDER — MAGNESIUM SULFATE IN WATER 40 MG/ML
2000 INJECTION, SOLUTION INTRAVENOUS ONCE
Status: COMPLETED | OUTPATIENT
Start: 2021-12-09 | End: 2021-12-09

## 2021-12-09 RX ORDER — METHOCARBAMOL 750 MG/1
750 TABLET, FILM COATED ORAL EVERY 8 HOURS
Status: DISCONTINUED | OUTPATIENT
Start: 2021-12-09 | End: 2021-12-13 | Stop reason: HOSPADM

## 2021-12-09 RX ADMIN — LEVOTHYROXINE SODIUM 25 MCG: 25 TABLET ORAL at 09:02

## 2021-12-09 RX ADMIN — DOCUSATE SODIUM 50MG AND SENNOSIDES 8.6MG 1 TABLET: 8.6; 5 TABLET, FILM COATED ORAL at 09:02

## 2021-12-09 RX ADMIN — INSULIN LISPRO 3 UNITS: 100 INJECTION, SOLUTION INTRAVENOUS; SUBCUTANEOUS at 00:36

## 2021-12-09 RX ADMIN — GABAPENTIN 300 MG: 300 CAPSULE ORAL at 09:02

## 2021-12-09 RX ADMIN — METHOCARBAMOL TABLETS 750 MG: 750 TABLET, COATED ORAL at 14:48

## 2021-12-09 RX ADMIN — METHOCARBAMOL TABLETS 750 MG: 750 TABLET, COATED ORAL at 08:58

## 2021-12-09 RX ADMIN — ENOXAPARIN SODIUM 30 MG: 100 INJECTION SUBCUTANEOUS at 11:06

## 2021-12-09 RX ADMIN — DEXTROSE AND SODIUM CHLORIDE 500 ML: 5; 450 INJECTION, SOLUTION INTRAVENOUS at 02:38

## 2021-12-09 RX ADMIN — ENOXAPARIN SODIUM 30 MG: 100 INJECTION SUBCUTANEOUS at 22:12

## 2021-12-09 RX ADMIN — METOPROLOL TARTRATE 50 MG: 50 TABLET, FILM COATED ORAL at 09:02

## 2021-12-09 RX ADMIN — INSULIN LISPRO 1 UNITS: 100 INJECTION, SOLUTION INTRAVENOUS; SUBCUTANEOUS at 21:18

## 2021-12-09 RX ADMIN — PANTOPRAZOLE SODIUM 40 MG: 40 TABLET, DELAYED RELEASE ORAL at 09:03

## 2021-12-09 RX ADMIN — ACETAMINOPHEN 1000 MG: 500 TABLET ORAL at 21:15

## 2021-12-09 RX ADMIN — INSULIN LISPRO 3 UNITS: 100 INJECTION, SOLUTION INTRAVENOUS; SUBCUTANEOUS at 03:46

## 2021-12-09 RX ADMIN — MAGNESIUM SULFATE 2000 MG: 2 INJECTION INTRAVENOUS at 11:06

## 2021-12-09 RX ADMIN — GABAPENTIN 300 MG: 300 CAPSULE ORAL at 23:22

## 2021-12-09 RX ADMIN — DEXTROSE MONOHYDRATE 2000 MG: 50 INJECTION, SOLUTION INTRAVENOUS at 05:40

## 2021-12-09 RX ADMIN — ACETAMINOPHEN 1000 MG: 500 TABLET ORAL at 14:48

## 2021-12-09 RX ADMIN — HYDROCHLOROTHIAZIDE 25 MG: 25 TABLET ORAL at 09:03

## 2021-12-09 RX ADMIN — POLYETHYLENE GLYCOL 3350 17 G: 17 POWDER, FOR SOLUTION ORAL at 08:58

## 2021-12-09 RX ADMIN — GABAPENTIN 300 MG: 300 CAPSULE ORAL at 14:48

## 2021-12-09 RX ADMIN — METHOCARBAMOL TABLETS 750 MG: 750 TABLET, COATED ORAL at 23:22

## 2021-12-09 RX ADMIN — PROPRANOLOL HYDROCHLORIDE 10 MG: 10 TABLET ORAL at 21:15

## 2021-12-09 RX ADMIN — PROPRANOLOL HYDROCHLORIDE 10 MG: 10 TABLET ORAL at 09:03

## 2021-12-09 RX ADMIN — INSULIN LISPRO 3 UNITS: 100 INJECTION, SOLUTION INTRAVENOUS; SUBCUTANEOUS at 11:16

## 2021-12-09 RX ADMIN — AMLODIPINE BESYLATE 10 MG: 10 TABLET ORAL at 09:03

## 2021-12-09 RX ADMIN — ACETAMINOPHEN 1000 MG: 500 TABLET ORAL at 05:40

## 2021-12-09 ASSESSMENT — PAIN DESCRIPTION - ORIENTATION
ORIENTATION: RIGHT
ORIENTATION: RIGHT

## 2021-12-09 ASSESSMENT — PAIN - FUNCTIONAL ASSESSMENT: PAIN_FUNCTIONAL_ASSESSMENT: PREVENTS OR INTERFERES SOME ACTIVE ACTIVITIES AND ADLS

## 2021-12-09 ASSESSMENT — PAIN SCALES - GENERAL
PAINLEVEL_OUTOF10: 2
PAINLEVEL_OUTOF10: 0
PAINLEVEL_OUTOF10: 1
PAINLEVEL_OUTOF10: 8
PAINLEVEL_OUTOF10: 8

## 2021-12-09 ASSESSMENT — PAIN DESCRIPTION - DESCRIPTORS
DESCRIPTORS: DISCOMFORT;ACHING
DESCRIPTORS: BURNING;DISCOMFORT;ACHING

## 2021-12-09 ASSESSMENT — PAIN DESCRIPTION - PAIN TYPE
TYPE: SURGICAL PAIN
TYPE: SURGICAL PAIN

## 2021-12-09 ASSESSMENT — PAIN DESCRIPTION - LOCATION
LOCATION: HIP
LOCATION: HIP

## 2021-12-09 NOTE — DISCHARGE INSTR - COC
Continuity of Care Form    Patient Name: Brent Whitten   :  1929  MRN:  [id number]    Admit date:  2021  Discharge date:  21    Code Status Order: DNR-CCA   Advance Directives:   Advance Care Flowsheet Documentation       Date/Time Healthcare Directive Type of Healthcare Directive Copy in [address] Agent's Name Healthcare Agent's Phone Number    21 [phone] Yes, patient has an advance directive for healthcare treatment Health care treatment directive; Durable power of  for health care; Living will Yes, copy in chart Adult Children Dev Lujan [phone]            Admitting Physician:  Micah Abreu MD  PCP: Jennifer Ashton MD    Discharging Nurse: Clarisse Styles Unit/Room#: 8028/8389-55  Discharging Unit Phone Number: [phone]    Emergency Contact:   Extended Emergency Contact Information  Primary Emergency Contact: [address] [facility]  Work Phone: [phone]  Mobile Phone: [phone]  Relation: Child  Secondary Emergency Contact: [facility], [address] [place] [address] Road Phone: [phone]  Relation: Grandchild    Past Surgical History:  Past Surgical History:   Procedure Laterality Date    BREAST LUMPECTOMY      right    COLONOSCOPY      ENDOSCOPY, COLON, DIAGNOSTIC      x3    HIP SURGERY Right 2021    HIP HEMIARTHROPLASTY    HIP SURGERY Right 2021    HIP HEMIARTHROPLASTY,  MEJIA AND NEPHEW performed by Justin Garza DO at [facility]      nose       Immunization History:   Immunization History   Administered Date(s) Administered    COVID-19, SAFIA Reyes, 30mcg/0.3mL 2021, 2021       Active Problems:  Patient Active Problem List   Diagnosis Code    Chronic kidney disease, stage III (moderate) ([facility] N18.30    Essential hypertension I10    Type 2 diabetes mellitus without complication ([facility] L73.2    Microalbuminuria R80.9    Hypovitaminosis D E55.9    Closed subcapital fracture of femur, right, [facility] S72.011A       Isolation/Infection:   Isolation            No Isolation          Patient Infection Status       None to display            Nurse Assessment:  Last Vital Signs: /82   Pulse 94   Temp 97.9 °F (36.6 °C) (Oral)   Resp 18   Ht 5' 4\" (1.626 m)   Wt 176 lb 2.4 oz (79.9 kg)   SpO2 94%   BMI 30.24 kg/m²     Last documented pain score (0-10 scale): Pain Level: 0  Last Weight:   Wt Readings from Last 1 Encounters:   12/09/21 176 lb 2.4 oz (79.9 kg)     Mental Status:  oriented and alert    IV Access:  - None    Nursing Mobility/ADLs:  Walking   Assisted  Transfer  Assisted  Bathing  Assisted  Dressing  Assisted  Toileting  Assisted  Feeding  Assisted  Med Admin  Independent  Med Delivery   whole    Wound Care Documentation and Therapy:        Elimination:  Continence: Bowel: Yes  Bladder: Yes  Urinary Catheter: None   Colostomy/Ileostomy/Ileal Conduit: No       Date of Last BM: 12/13/21    Intake/Output Summary (Last 24 hours) at 12/9/2021 1510  Last data filed at 12/9/2021 0643  Gross per 24 hour   Intake 1076 ml   Output --   Net 1076 ml     I/O last 3 completed shifts:   In: 4942 [I.V.:1476]  Out: -     Safety Concerns:     History of Falls (last 30 days) and At Risk for Falls    Impairments/Disabilities:      None    Nutrition Therapy:  Current Nutrition Therapy:   - Oral Diet:  General    Routes of Feeding: Oral  Liquids: No Restrictions  Daily Fluid Restriction: no  Last Modified Barium Swallow with Video (Video Swallowing Test): not done      Rehab Therapies: Physical Therapy and Occupational Therapy  Weight Bearing Status/Restrictions: No weight bearing restirctions  Other Medical Equipment (for information only, NOT a DME order):  walker  Other Treatments:     Patient's personal belongings (please select all that are sent with patient):  Pt belonging bag    RN SIGNATURE:  Electronically signed by Ysabel Richardson RN on 12/13/21 at 10:01 AM EST    CASE MANAGEMENT/SOCIAL WORK SECTION    Inpatient Status Date: ***    Readmission Risk Assessment Score:  Readmission Risk              Risk of Unplanned Readmission:  16           Discharging to Facility/ [address] Details  FAX            [address], [facility] [address]       Phone: [phone]       Fax: [phone]          Dialysis Facility (if applicable)   Name:  Address:  Dialysis Schedule:  Phone:  Fax:    / signature: Electronically signed by Aquiles Napoles RN on 12/13/21 at 11:40 AM EST    PHYSICIAN SECTION    Prognosis: Good    Condition at Discharge: Stable    Rehab Potential (if transferring to Rehab): {Prognosis:[id number]}    Recommended Labs or Other Treatments After Discharge: ***    Physician Certification: I certify the above information and transfer of Yvon Lau  is necessary for the continuing treatment of the diagnosis listed and that she requires [facility] for less 30 days.      Update Admission H&P: No change in H&P    PHYSICIAN SIGNATURE:  Electronically signed by FARRUKH Denton CNP on 12/10/21 at 1:32 PM EST

## 2021-12-09 NOTE — PROGRESS NOTES
Pt still hasn't voided since surgery, bladder scanned for 89ml. Trauma resident notified, no new orders. Will continue to monitor.

## 2021-12-09 NOTE — PROGRESS NOTES
PROGRESS NOTE      PATIENT NAME: [facility] RECORD NO. [id number]  DATE: 2021  SURGEON: Dr. Aleks Sanford: Elly Doherty MD    HD: # 2    ASSESSMENT    Patient Active Problem List   Diagnosis    Chronic kidney disease, stage III (moderate) ([address]    Essential hypertension    Type 2 diabetes mellitus without complication (HCC)    Microalbuminuria    Hypovitaminosis D    Closed subcapital fracture of femur, right, initial encounter [facility])       MEDICAL DECISION MAKING AND PLAN    80-year-old female status post fall with right femoral neck fracture status post hip hemiarthroplasty on 2021    Patient was seen and examined at bedside this morning. General diet as tolerated  Pain control-Multimodal  DVT prophylaxis-Lovenox  We will review morning labs  PT/OT  Patient is weightbearing as tolerated to right lower extremity  Encourage incentive spirometer use  Encourage patient to bed to bed to ambulate/up to chair  Discharge planning      [address] [place] [address] was seen and examined at bedside this morning. No acute events overnight. Afebrile. Tolerating diet. Pain well controlled. No nausea or vomiting. Resting comfortable in bed.       OBJECTIVE  VITALS: Temp: Temp: 97.7 °F (36.5 °C)Temp  Av.7 °F (34.8 °C)  Min: 94 °F (34.4 °C)  Max: 97.7 °F (42.5 °C) BP Systolic (04YZX), WHT:298 , Min:94 , OMT:612   Diastolic (15QPM), IIO:90, Min:53, Max:132   Pulse Pulse  Av.8  Min: 57  Max: 94 Resp Resp  Av.7  Min: 0  Max: 25 Pulse ox SpO2  Av.2 %  Min: 91 %  Max: 100 %  GENERAL: alert, no distress  NEURO: Cranial nerves II through XII intact, no focal deficits  HEENT: Neck supple, trachea midline  LUNGS: clear to ausculation, without wheezes, rales or rhonci  HEART: normal rate and regular rhythm  ABDOMEN: soft, non-tender, non-distended, bowel sounds present in all 4 quadrants and no guarding or peritoneal signs present  EXTREMITY: no cyanosis, clubbing or

## 2021-12-09 NOTE — PROGRESS NOTES
Orthopedic Progress Note    Patient:  Swapnil Stephens  YOB: 1929     80 y.o. female    Subjective:  Patient seen and examined at bedside this morning  No complaints or concerns  Pt has not voided since surgery, recent bladder scan showed 135cc. Fluid bolus given by primary. Pain controlled, resting comfortably  Denies fever, HA, CP, SOB, N/V, dysuria  No formal PT/OT assessment yesterday    Vitals reviewed, afebrile    Objective:   Vitals:    12/09/21 0400   BP: (!) 160/57   Pulse: 90   Resp: 16   Temp: 97.7 °F (36.5 °C)   SpO2: 94%     Gen: NAD, cooperative     Cardiovascular: Regular rate    Respiratory: Chest symmetric, no accessory muscle use    RLE: Optifoam dressing on, which is clean/dry/intact. Tenderness to palpation about the hip. Compartments soft and easily compressible. EHL/FHL/TA/GS complex motor intact. Sural/saphenous/SPN/DPN/plantar nerve distribution SILT. DP and PT pulses 2+ with BCR. Recent Labs     12/07/21  2348 12/08/21  0037 12/08/21  0541 12/08/21  0744 12/08/21  1858   WBC  --   --  7.0  --   --    HGB  --   --  12.2   < > 12.2   HCT  --   --  35.8*   < > 37.4   PLT  --   --  159  --   --    INR 1.2  --   --   --   --    NA  --   --  135   < > 135   K  --   --  3.1*   < > 3.7   BUN  --   --  14   < > 14   CREATININE  --   --  0.69   < > 0.77   GLUCOSE  --    < > 160*   < > 163*    < > = values in this interval not displayed.       Meds: Ancef  See rec for complete list     Impression/plan: 80 y.o. female who fell from standing height, being seen for:    -Right femoral neck fracture s/p hip hemiarthroplasty, POD#1    -Maintain Optifoam dressings, please contact ortho if issues  -Finish post-op Ancef  -Pain control per primary  -Primary team for medical managment  -OK to continue chemical AC today (POD1), per ortho standpoint  -OK for diet per ortho standpoint  -WB status: WBAT RLE  -Ice (20 min, 1 hour off) for edema/pain control  -Encourage deep breathing and incentive spirometry use  -Continue to work with PT/OT  -Follow-up with Dr. Bob on 12/21  -Please page ortho with any questions    Patrick Echavarria DO  Orthopedic Surgery Resident, PGY-1  [place] [address], [facility]    PGY-3 Addendum    Patient seen and examined personally, and I agree with subjective portion as stated above by Dr. Ashley Rose. All disagreements have been changed in the above note. Patient doing well. Nerve block wearing off, but patients pain well controlled. Plan to work with PT today. NVI to the RLE. Check morning labs. Complete post op ancef. Follow up with Dr. Bob on 12/21 outpatient.     Electronically signed by Silviano Tijerina DO, on 12/9/2021 at 6:01 AM.

## 2021-12-09 NOTE — OP NOTE
[address] [facility] [address]                                OPERATIVE REPORT    PATIENT NAME: Zoë Ornelas                     :        1929  MED REC NO:   [id number]                             ROOM:       1022  ACCOUNT NO:   [de-identified]                           ADMIT DATE: 2021  PROVIDER:     Darleene Goodpasture    DATE OF PROCEDURE:  2021    PREOPERATIVE DIAGNOSIS:  Right femoral neck fracture. POSTOPERATIVE DIAGNOSIS:  Right femoral neck fracture. OPERATION PERFORMED:  Right hip hemiarthroplasty, CPT 14527. ATTENDING SURGEON:  Darleene Goodpasture, DO    ASSISTANTS:  [facility], , PGY-5.  [facility], , PGY-2. ANESTHESIA:  General.    ESTIMATED BLOOD LOSS:  150 mL. IV FLUIDS:  400 mL of crystalloid. COMPLICATIONS:  None. SPECIMENS:  None. IMPLANTS:  [employer] size 14 Synergy unipolar stem. INDICATIONS:  The patient is a 27-year-old female who sustained a  complete displaced right femoral neck fracture, result of a ground-level  fall. The patient received appropriate preoperative clearance. We  discussed with her the injury as well as the recommended treatment of  hemiarthroplasty due to her fracture pattern and age. She stated clear  understanding of the risks, benefits, and alternatives; and agreed to  proceed, provided written informed consent. Her operative site was  marked. OPERATIVE PROCEDURE:  The patient was transported to the operating room. General anesthesia was administered by the anesthesia providers without  complication. She was transferred to a Fenton table and appropriately  secured to the bed. All bony prominences well padded. We obtained  preoperative images of the contralateral hip to use as an intraoperative  template. We then prepped and draped the right lower extremity in the  usual sterile fashion.   We completed a time-out, which correctly  identified the patient, planned procedure, and operative site. After  everyone agreed, we continued. The patient did receive 1 gm of TXA  along with her preoperative antibiotics prior to incision. We then  completed a standard direct anterior approach for arthroplasty. The  lateral femoral circumflex vessels were identified and thoroughly  cauterized prior to being ligated. We had no significant intraoperative  bleeding. The capsulotomy was then completed and the femoral neck  osteotomy was completed with an oscillating saw after confirming  appropriate location fluoroscopically. The femoral head and neck were  then extracted using a threaded Steinmann pin. The acetabulum was  examined and found no injury or significant chondral wear. We then  performed a release of the inferior capsule down to the level of lesser  trochanter in order to allow appropriate external rotation of the femur. We then fully extended and adducted the leg, and completed further  release of the posterior and lateral capsules to allow appropriate  elevation of the femur into the wound. We also used a table mounted  hook to carefully help elevate the femur. We then follow the standard  steps of using a box osteotome and canal finder. We then used the  reamers and broaches to reach a 14 mm stem and the patient had excellent  bone quality. Therefore, we felt that a press-fit construct would be  more than acceptable to avoid any potential risks of cement. Unipolar  standard offset 45 mm head trial components were then inserted and the  hip was reduced. There was appropriate fit of the femoral head and  minimal shuck with the current alignment. Radiographs showed  appropriate restoration of leg length and offset as well as adequate  component positioning. There was no evidence of intraoperative fracture  fluoroscopically or intraoperatively. We then safely dislocated the  hip, removed all trial components.   We

## 2021-12-09 NOTE — PROGRESS NOTES
Patient assisted twice during this shift to the bathroom with staff using the walker and did not put weight on right leg.

## 2021-12-09 NOTE — PROGRESS NOTES
Physical Therapy    Facility/Department: [address] ORTHO/MED SURG  Initial Assessment    NAME: Wolf Balbuena  : 1929  MRN: [id number]    Date of Service: 2021  Chief Complaint   Patient presents with    Fall     possible hip fx        Discharge Recommendations:  Patient would benefit from continued therapy after discharge   PT Equipment Recommendations  Equipment Needed: No  Other: Pt owns a RW and writer recommends use of device at all times    Assessment   Body structures, Functions, Activity limitations: Decreased functional mobility ; Decreased strength; Decreased safe awareness; Decreased endurance; Decreased balance  Assessment: Pt with impaired mobility requiring mostly min to mod A for most aspects of mobility. Pt able to ambulate today with use of RW and up to min A. Pt would be unsafe to return to her prior living arrangements and will benefit from further therapy at discharge. Pt will need physical assistance at all times for safe mobility. Prognosis: Good  Decision Making: Medium Complexity  PT Education: Goals; PT Role; Plan of Care; Transfer Training; Functional Mobility Training; Gait Training  REQUIRES PT FOLLOW UP: Yes  Activity Tolerance  Activity Tolerance: Patient limited by fatigue; Patient limited by endurance       Patient Diagnosis(es): The primary encounter diagnosis was Closed fracture of neck of right femur, initial encounter ([facility] [address] A diagnosis of Fall, initial encounter was also pertinent to this visit. has a past medical history of Anxiety, Cancer ([facility] [address] Chronic kidney disease, DM (diabetes mellitus) ([facility] [address] GI bleed, History of blood transfusion, Hypertension, Liver disease, and Osteoarthritis. has a past surgical history that includes Breast lumpectomy; lymph node dissection; Colonoscopy; Endoscopy, colon, diagnostic; and hip surgery (Right, 2021).     Restrictions  Restrictions/Precautions  Restrictions/Precautions: Weight Bearing, Fall Risk  Required Braces or Orthoses?: No  Lower Extremity Weight Bearing Restrictions  Right Lower Extremity Weight Bearing: Weight Bearing As Tolerated  Position Activity Restriction  Other position/activity restrictions: POD 1 right hemiarthroplasty with Dr Kinsey Boudreaux; CTLS clear 12/7/21  Vision/Hearing  Vision: Impaired  Vision Exceptions: Wears glasses at all times  Hearing: Exceptions to [facility]  Hearing Exceptions: Hard of hearing/hearing concerns; Bilateral hearing aid     Subjective  General  Patient assessed for rehabilitation services?: Yes  Response To Previous Treatment: Not applicable  Family / Caregiver Present: No  Follows Commands: Within Functional Limits  Subjective  Subjective: Pt supine in bed and agreeable to therapy with encouragement. RN agreeable to therapy. Pt reports RLE pain after mobility. Pain Screening  Patient Currently in Pain: Yes  Pain Assessment  Pain Assessment: 0-10  Pain Level: 8  Pain Type: Surgical pain  Pain Location: Hip  Pain Orientation: Right  Pain Descriptors: Discomfort; Aching  Functional Pain Assessment: Prevents or interferes some active activities and ADLs  Non-Pharmaceutical Pain Intervention(s): Ambulation/Increased Activity;  Distraction; Repositioned  Response to Pain Intervention: Patient Satisfied  Vital Signs  Patient Currently in Pain: Yes       Orientation  Orientation  Overall Orientation Status: Within Functional Limits  Social/Functional History  Social/Functional History  Lives With: Alone  Type of Home: Assisted living [facility])  Home Layout: One level (3rd floor apartment)  Home Access: Level entry, Elevator (Level entry and then elevator to third floor)  Bathroom Shower/Tub: Walk-in shower  Bathroom Toilet: Standard  Bathroom Equipment: Grab bars in shower (shower bench)  Home Equipment: Cane, Rolling walker (reports use of RW at all times)  ADL Assistance: Independent  Homemaking Responsibilities: Yes  Meal Prep Responsibility: Primary  Laundry Responsibility: Primary  Cleaning Responsibility: Primary (reports someone comes in once per week to do heavy cleaning but she does all the daily cleaning tasks)  Shopping Responsibility: Primary  Ambulation Assistance: Independent (with RW most of the time)  Transfer Assistance: Independent  Active : No  Patient's  Info: Family has been driving the last couple of weeks as pt hasn't felt comfortable; facility also able to provide transport  Mode of Transportation: Family  Occupation: Retired  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 Cognition   Cognition  Overall Cognitive Status: Exceptions  Following Commands:  Follows multistep commands with increased time  Attention Span: Difficulty attending to directions  Safety Judgement: Decreased awareness of need for assistance; Decreased awareness of need for safety  Problem Solving: Assistance required to generate solutions; Assistance required to identify errors made  Insights: Decreased awareness of deficits  Initiation: Requires cues for some  Sequencing: Requires cues for some    Objective          AROM RLE (degrees)  RLE AROM: WFL  AROM LLE (degrees)  LLE AROM : WFL  AROM RUE (degrees)  RUE AROM : WFL  AROM LUE (degrees)  LUE AROM : WFL  Strength RLE  Strength RLE: Exception  R Hip Flexion: 3/5  R Knee Flexion: 3/5  R Knee Extension: 3/5  R Ankle Dorsiflexion: 4-/5  R Ankle Plantar flexion: 4-/5  Strength LLE  Strength LLE: Exception  L Hip Flexion: 4/5  L Knee Flexion: 4/5  L Knee Extension: 4/5  L Ankle Dorsiflexion: 4/5  L Ankle Plantar Flexion: 4/5  Strength RUE  Comment: co evaluation with OT- see OT evaluation for full UE assessment  Strength LUE  Comment: co evaluation with OT- see OT evaluation for full UE assessment     Sensation  Overall Sensation Status: WFL (Pt denies any numbness or tingling)  Bed mobility  Supine to Sit: Moderate assistance; Minimal assistance (Min A initially for RLE then mod A for trunk)  Sit to Supine:  (retired to chair at end of session)  Scooting:  Moderate assistance  Comment: HOB flat to mimic home setup  Transfers  Sit to Stand: Minimal Assistance  Stand to sit: Minimal Assistance  Comment: Cues for hand placement  Ambulation  Ambulation?: Yes  More Ambulation?: No  Ambulation 1  Surface: level tile  Device: Rolling Walker  Other Apparatus: O2 (2L)  Assistance: Contact guard assistance; Minimal assistance  Quality of Gait: Min A for one LOB with bout of dizziness, step to gait with significantly decreased step length, mildly unsteady  Gait Deviations: Slow Lesvia; Decreased step length; Decreased step height  Distance: 12ft  Stairs/Curb  Stairs?: No     Balance  Posture: Fair  Sitting - Static: Good; -  Sitting - Dynamic: Fair; +  Standing - Static: Fair  Standing - Dynamic: Fair  Comments: standing balance assessed with RW  Exercises  Hamstring Sets: x10 RLE  Quad Sets: x10 RLE  Heelslides: x10 RLE AAROM  Gluteal Sets: x10  Knee Long Arc Quad: x8 RLE; pt declined performing any further  Ankle Pumps: x10 RLE     Plan   Plan  Times per week: 1-2x per day; 6-7x per week  Times per day:  (1-2x per day)  Current Treatment Recommendations: Strengthening, ROM, Balance Training, Functional Mobility Training, Transfer Training, Endurance Training, Gait Training, Stair training, Home Exercise Program, Safety Education & Training, Patient/Caregiver Education & Training  Safety Devices  Type of devices: Call light within reach, Left in chair, Chair alarm in place, Gait belt, Nurse notified  Restraints  Initially in place: No      AM-PAC Score     AM-PAC Inpatient Mobility without Stair Climbing Raw Score : 14 (12/09/21 1140)  AM-PAC Inpatient without Stair Climbing T-Scale Score : 40.85 (12/09/21 1140)  Mobility Inpatient CMS 0-100% Score: 53.33 (12/09/21 1140)  Mobility Inpatient without Chuck CMS G-Code Modifier : CK (12/09/21 1140)       Goals  Short term goals  Time Frame for Short term goals: 14 visits  Short term goal 1: Pt to ambulate 100ft modified independently with RW WBAT RLE  Short term goal 2: Pt to sit <> stand transfer independently WBAT RLE  Short term goal 3: Pt to demonstrate independent bed mobility  Short term goal 4: Pt to demonstrate good standing balance to decrease risk of falls  Short term goal 5: Pt to tolerate 30 minutes of therapy for endurance       Therapy Time   Individual Concurrent Group Co-treatment   Time In 0914         Time Out 0958         Minutes 44         Timed Code Treatment Minutes: [address], PT

## 2021-12-09 NOTE — CARE COORDINATION
Transition planning  Spoke with patient re: SNF choices, she states her granddaughter Chrissy Kong is visiting facilities today. Patient states she wants her granddaughter Chrissy Kong to make decision of which SNF to send referrals to.  21  and spoke to pt's granddaughter Chrissy Kong, she gives choices for SNF: 1.[facility] 2. [facility] 3. [facility]. E-referral [facility].

## 2021-12-09 NOTE — CARE COORDINATION
Met with pt to complete an SBIRT. Pt is alert and oriented. She denies alcohol and drug use. She denies depression. SBIRT is negative. Alcohol Screening and Brief Intervention        Recent Labs     12/07/21  1900   ALC <10       Alcohol Pre-screening     (WOMEN ONLY) How many times in the past year have you had 4 or more drinks in a day?: None    Alcohol Screening Audit       Drug Pre-Screening   How many times in the past year have you used a recreational drug or used a prescription medication for nonmedical reasons?: None    Drug Screening DAST       Mood Pre-Screening (PHQ-2)  During the past two weeks, have you been bothered by little interest or pleasure in doing things?: No  During the past two weeks, have you been bothered by feeling down, depressed, or hopeless?: No    Mood Pre-Screening (PHQ-9)         I have interviewed Amalia Easton, [id number] regarding  Her alcohol consumption/drug use and risk for excessive use. Screenings were negative. Patient  N/A intervention at this time.    Deferred []    Completed on: 12/9/2021   SWAPNIL Hdz

## 2021-12-09 NOTE — ANESTHESIA POSTPROCEDURE EVALUATION
Department of Anesthesiology  Postprocedure Note    Patient: Paco Felipe  MRN: [id number]  [place]: 11/9/1929  Date of evaluation: 12/8/2021  Time:  10:30 PM     Procedure Summary     Date: 12/08/21 Room / Location: [address]    Anesthesia Start: 1231 Anesthesia Stop: 1519    Procedure: HIP HEMIARTHROPLASTY,  MEJIA AND NEPHEW (Right ) Diagnosis: (Right Femoral Neck Fracture)    Surgeons: Griselda Spaulding DO Responsible Provider: Roxanne John MD    Anesthesia Type: general ASA Status: 3          Anesthesia Type: general    Geronimo Phase I: Geronimo Score: 9    Geronimo Phase II:      Last vitals: Reviewed and per EMR flowsheets.        Anesthesia Post Evaluation    Patient location during evaluation: PACU  Patient participation: complete - patient participated  Level of consciousness: awake and alert  Airway patency: patent  Nausea & Vomiting: no nausea and no vomiting  Complications: no  Cardiovascular status: blood pressure returned to baseline  Respiratory status: acceptable  Hydration status: euvolemic

## 2021-12-09 NOTE — PROGRESS NOTES
precautions. Pt demo fair return. REQUIRES OT FOLLOW UP: Yes  Activity Tolerance  Activity Tolerance: Patient limited by fatigue; Patient Tolerated treatment well  Safety Devices  Safety Devices in place: Yes  Type of devices: Call light within reach; Chair alarm in place; Nurse notified; Left in chair; Gait belt  Restraints  Initially in place: No           Patient Diagnosis(es): The primary encounter diagnosis was Closed fracture of neck of right femur, initial encounter ([facility] [address] A diagnosis of Fall, initial encounter was also pertinent to this visit. has a past medical history of Anxiety, Cancer ([facility] [address] Chronic kidney disease, DM (diabetes mellitus) ([facility] [address] GI bleed, History of blood transfusion, Hypertension, Liver disease, and Osteoarthritis. has a past surgical history that includes Breast lumpectomy; lymph node dissection; Colonoscopy; Endoscopy, colon, diagnostic; hip surgery (Right, 12/08/2021); and hip surgery (Right, 12/8/2021). Restrictions  Restrictions/Precautions  Restrictions/Precautions: Weight Bearing, Fall Risk, General Precautions  Required Braces or Orthoses?: No  Lower Extremity Weight Bearing Restrictions  Right Lower Extremity Weight Bearing: Weight Bearing As Tolerated  Position Activity Restriction  Other position/activity restrictions: s/p R hemiarthroplasty 12/8/21; CTLS clear 12/7/21; 2L NC O2    Subjective   General  Patient assessed for rehabilitation services?: Yes  Family / Caregiver Present: No  General Comment  Comments: RN ok'd pt for OT eval this date. Pt agreeable and pleasant/cooperative throughout.   Patient Currently in Pain: Yes  Pain Assessment  Pain Assessment: 0-10  Pain Level: 8  Pain Type: Surgical pain  Pain Location: Hip  Pain Orientation: Right  Pain Descriptors: Burning; Discomfort; Aching  Functional Pain Assessment: Prevents or interferes some active activities and ADLs  Non-Pharmaceutical Pain Intervention(s): Ambulation/Increased Activity; Distraction; Repositioned  Response to Pain Intervention: Patient Satisfied    Social/Functional History  Social/Functional History  Lives With: Alone  Type of Home: Assisted living [facility])  Home Layout: One level (3rd floor apartment)  Home Access: Level entry, Elevator (Level entry and then elevator to third floor)  Bathroom Shower/Tub: Walk-in shower  Bathroom Toilet: Standard  Bathroom Equipment: Grab bars in shower (shower bench)  Home Equipment: Cane, Rolling walker (reports use of RW at all times)  ADL Assistance: Independent  Homemaking Responsibilities: Yes  Meal Prep Responsibility: Primary  Laundry Responsibility: Primary  Cleaning Responsibility: Primary (reports someone comes in once per week to do heavy cleaning but pt does all the daily cleaning tasks)  Shopping Responsibility: Primary  Ambulation Assistance: Independent (with RW most of the time)  Transfer Assistance: Independent  Active : No  Patient's  Info: Family has been driving the last couple of weeks as pt hasn't felt comfortable; facility also able to provide transport  Mode of Transportation: Family  Occupation: Retired  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        Objective   Vision: Impaired  Vision Exceptions: Wears glasses at all times (glasses present in room)  Hearing: Exceptions to [facility]  Hearing Exceptions: Hard of hearing/hearing concerns; Bilateral hearing aid          Balance  Sitting Balance: Contact guard assistance (Pt demo dynamic sitting EOB with CGA and static sitting with SBA for ~12 minutes, pt denied any SOB or dizziness, but ed to maintain head upright and eyes open)  Standing Balance: Contact guard assistance  Standing Balance  Time: ~2 minutes  Activity: standing EOB in prep for functional mobility  Comment: utilizing RW; pt required Status: Exceptions  Following Commands:  Follows multistep commands with increased time  Attention Span: Difficulty attending to directions  Safety Judgement: Decreased awareness of need for assistance; Decreased awareness of need for safety  Problem Solving: Assistance required to generate solutions; Assistance required to identify errors made  Insights: Decreased awareness of deficits  Initiation: Requires cues for some  Sequencing: Requires cues for some        Sensation  Overall Sensation Status: WFL (pt denies any numbness/tingling this date)        LUE PROM (degrees)  LUE PROM: WFL  LUE AROM (degrees)  LUE AROM : Exceptions  L Shoulder Flexion 0-180: 0-85; all other joints WFL  Left Hand AROM (degrees)  Left Hand AROM: WFL  RUE PROM (degrees)  RUE PROM: WFL  RUE AROM (degrees)  RUE AROM : Exceptions  R Shoulder Flexion 0-180: 0-110; all other joints WFL  Right Hand AROM (degrees)  Right Hand AROM: WFL  LUE Strength  Gross LUE Strength: Exceptions to [facility]  L Shoulder Flex: 3-/5  L Shoulder Ext: 3-/5  L Elbow Flex: 4/5  L Elbow Ext: 4/5  L Wrist Flex: 4/5  L Wrist Ext: 4/5  L Hand General: 4/5  RUE Strength  Gross RUE Strength: Exceptions to [facility]  R Shoulder Flex: 3-/5  R Shoulder Ext: 3-/5  R Elbow Flex: 4/5  R Elbow Ext: 4/5  R Wrist Flex: 4/5  R Wrist Ext: 4/5  R Hand General: 4/5                   Plan   Plan  Times per week: 3-5x/wk  Current Treatment Recommendations: Pain Management, Patient/Caregiver Education & Training, Safety Education & Training, Self-Care / ADL, Cognitive/Perceptual Training, Home Management Training, Equipment Evaluation, Education, & procurement, Functional Mobility Training, Endurance Training, Balance Training      AM-PAC Score        AM-PAC Inpatient Daily Activity Raw Score: 18 (12/09/21 1216)  AM-PAC Inpatient ADL T-Scale Score : 38.66 (12/09/21 1216)  ADL Inpatient CMS 0-100% Score: 46.65 (12/09/21 1216)  ADL Inpatient CMS G-Code Modifier : CK (12/09/21 1216)    Goals  Short term goals  Time Frame for Short term goals: By discharge, pt will:  Short term goal 1: demo functional transfers/mobility with SBA and LRD with good safety awareness  Short term goal 2: demo LB ADL with Min A and AE PRN  Short term goal 3: demo dynamic standing and reaching outside DAVID for 15+ minutes with SBA to increase balance and activity tolerance for participation in ADLs  Short term goal 4: demo UB ADL with SUP  Short term goal 5: utilize 2 EC/WS techs during  functional activities with <2 VCs  Short term goal 6: retrieve and transport 5 items around room at different heights with CGA to increase endurance and balance for participation in ADLs       Therapy Time   Individual Concurrent Group Co-treatment   Time In 0914         Time Out 0957         Minutes 43         Timed Code Treatment Minutes: 12 Minutes     Co-Eval with PT  VAN Ye

## 2021-12-10 LAB
ABSOLUTE EOS #: 0.35 K/UL (ref 0–0.44)
ABSOLUTE IMMATURE GRANULOCYTE: 0.04 K/UL (ref 0–0.3)
ABSOLUTE LYMPH #: 1.11 K/UL (ref 1.1–3.7)
ABSOLUTE MONO #: 1.19 K/UL (ref 0.1–1.2)
ANION GAP SERPL CALCULATED.3IONS-SCNC: 13 MMOL/L (ref 9–17)
ANION GAP SERPL CALCULATED.3IONS-SCNC: 14 MMOL/L (ref 9–17)
BASOPHILS # BLD: 1 % (ref 0–2)
BASOPHILS ABSOLUTE: 0.06 K/UL (ref 0–0.2)
BUN BLDV-MCNC: 21 MG/DL (ref 8–23)
BUN BLDV-MCNC: 23 MG/DL (ref 8–23)
BUN/CREAT BLD: ABNORMAL (ref 9–20)
BUN/CREAT BLD: ABNORMAL (ref 9–20)
CALCIUM SERPL-MCNC: 8.6 MG/DL (ref 8.6–10.4)
CALCIUM SERPL-MCNC: 8.7 MG/DL (ref 8.6–10.4)
CHLORIDE BLD-SCNC: 90 MMOL/L (ref 98–107)
CHLORIDE BLD-SCNC: 91 MMOL/L (ref 98–107)
CO2: 20 MMOL/L (ref 20–31)
CO2: 20 MMOL/L (ref 20–31)
CREAT SERPL-MCNC: 1.02 MG/DL (ref 0.5–0.9)
CREAT SERPL-MCNC: 1.07 MG/DL (ref 0.5–0.9)
DIFFERENTIAL TYPE: ABNORMAL
EOSINOPHILS RELATIVE PERCENT: 4 % (ref 1–4)
GFR AFRICAN AMERICAN: 58 ML/MIN
GFR AFRICAN AMERICAN: >60 ML/MIN
GFR NON-AFRICAN AMERICAN: 48 ML/MIN
GFR NON-AFRICAN AMERICAN: 51 ML/MIN
GFR SERPL CREATININE-BSD FRML MDRD: ABNORMAL ML/MIN/{1.73_M2}
GLUCOSE BLD-MCNC: 148 MG/DL (ref 65–105)
GLUCOSE BLD-MCNC: 149 MG/DL (ref 65–105)
GLUCOSE BLD-MCNC: 151 MG/DL (ref 65–105)
GLUCOSE BLD-MCNC: 158 MG/DL (ref 70–99)
GLUCOSE BLD-MCNC: 163 MG/DL (ref 70–99)
HCT VFR BLD CALC: 29.8 % (ref 36.3–47.1)
HEMOGLOBIN: 10.5 G/DL (ref 11.9–15.1)
IMMATURE GRANULOCYTES: 0 %
LYMPHOCYTES # BLD: 12 % (ref 24–43)
MAGNESIUM: 1.6 MG/DL (ref 1.6–2.6)
MCH RBC QN AUTO: 33.2 PG (ref 25.2–33.5)
MCHC RBC AUTO-ENTMCNC: 35.2 G/DL (ref 28.4–34.8)
MCV RBC AUTO: 94.3 FL (ref 82.6–102.9)
MONOCYTES # BLD: 12 % (ref 3–12)
NRBC AUTOMATED: 0 PER 100 WBC
PDW BLD-RTO: 14 % (ref 11.8–14.4)
PHOSPHORUS: 3.6 MG/DL (ref 2.6–4.5)
PLATELET # BLD: 165 K/UL (ref 138–453)
PLATELET ESTIMATE: ABNORMAL
PMV BLD AUTO: 10.2 FL (ref 8.1–13.5)
POTASSIUM SERPL-SCNC: 3.6 MMOL/L (ref 3.7–5.3)
POTASSIUM SERPL-SCNC: 3.8 MMOL/L (ref 3.7–5.3)
RBC # BLD: 3.16 M/UL (ref 3.95–5.11)
RBC # BLD: ABNORMAL 10*6/UL
SEG NEUTROPHILS: 72 % (ref 36–65)
SEGMENTED NEUTROPHILS ABSOLUTE COUNT: 6.9 K/UL (ref 1.5–8.1)
SODIUM BLD-SCNC: 123 MMOL/L (ref 135–144)
SODIUM BLD-SCNC: 125 MMOL/L (ref 135–144)
WBC # BLD: 9.7 K/UL (ref 3.5–11.3)
WBC # BLD: ABNORMAL 10*3/UL

## 2021-12-10 PROCEDURE — 6360000002 HC RX W HCPCS: Performed by: STUDENT IN AN ORGANIZED HEALTH CARE EDUCATION/TRAINING PROGRAM

## 2021-12-10 PROCEDURE — 1200000000 HC SEMI PRIVATE

## 2021-12-10 PROCEDURE — 84100 ASSAY OF PHOSPHORUS: CPT

## 2021-12-10 PROCEDURE — 2580000003 HC RX 258: Performed by: STUDENT IN AN ORGANIZED HEALTH CARE EDUCATION/TRAINING PROGRAM

## 2021-12-10 PROCEDURE — 51798 US URINE CAPACITY MEASURE: CPT

## 2021-12-10 PROCEDURE — 97530 THERAPEUTIC ACTIVITIES: CPT

## 2021-12-10 PROCEDURE — 51701 INSERT BLADDER CATHETER: CPT

## 2021-12-10 PROCEDURE — 97116 GAIT TRAINING THERAPY: CPT

## 2021-12-10 PROCEDURE — 80048 BASIC METABOLIC PNL TOTAL CA: CPT

## 2021-12-10 PROCEDURE — 97110 THERAPEUTIC EXERCISES: CPT

## 2021-12-10 PROCEDURE — 82947 ASSAY GLUCOSE BLOOD QUANT: CPT

## 2021-12-10 PROCEDURE — 83735 ASSAY OF MAGNESIUM: CPT

## 2021-12-10 PROCEDURE — 6360000002 HC RX W HCPCS: Performed by: NURSE PRACTITIONER

## 2021-12-10 PROCEDURE — 85025 COMPLETE CBC W/AUTO DIFF WBC: CPT

## 2021-12-10 PROCEDURE — 6370000000 HC RX 637 (ALT 250 FOR IP): Performed by: STUDENT IN AN ORGANIZED HEALTH CARE EDUCATION/TRAINING PROGRAM

## 2021-12-10 PROCEDURE — 36415 COLL VENOUS BLD VENIPUNCTURE: CPT

## 2021-12-10 RX ORDER — SODIUM CHLORIDE 9 MG/ML
INJECTION, SOLUTION INTRAVENOUS CONTINUOUS
Status: DISCONTINUED | OUTPATIENT
Start: 2021-12-10 | End: 2021-12-12

## 2021-12-10 RX ORDER — MAGNESIUM SULFATE HEPTAHYDRATE 40 MG/ML
4000 INJECTION, SOLUTION INTRAVENOUS ONCE
Status: COMPLETED | OUTPATIENT
Start: 2021-12-10 | End: 2021-12-10

## 2021-12-10 RX ORDER — GABAPENTIN 100 MG/1
200 CAPSULE ORAL 2 TIMES DAILY
Status: DISCONTINUED | OUTPATIENT
Start: 2021-12-10 | End: 2021-12-12

## 2021-12-10 RX ADMIN — SODIUM CHLORIDE: 9 INJECTION, SOLUTION INTRAVENOUS at 22:28

## 2021-12-10 RX ADMIN — DOCUSATE SODIUM 50MG AND SENNOSIDES 8.6MG 1 TABLET: 8.6; 5 TABLET, FILM COATED ORAL at 21:20

## 2021-12-10 RX ADMIN — ACETAMINOPHEN 1000 MG: 500 TABLET ORAL at 13:22

## 2021-12-10 RX ADMIN — ACETAMINOPHEN 1000 MG: 500 TABLET ORAL at 06:49

## 2021-12-10 RX ADMIN — ACETAMINOPHEN 1000 MG: 500 TABLET ORAL at 21:24

## 2021-12-10 RX ADMIN — METHOCARBAMOL TABLETS 750 MG: 750 TABLET, COATED ORAL at 06:49

## 2021-12-10 RX ADMIN — MAGNESIUM SULFATE IN WATER 4000 MG: 40 INJECTION, SOLUTION INTRAVENOUS at 13:14

## 2021-12-10 RX ADMIN — HYDROCHLOROTHIAZIDE 25 MG: 25 TABLET ORAL at 13:14

## 2021-12-10 RX ADMIN — PANTOPRAZOLE SODIUM 40 MG: 40 TABLET, DELAYED RELEASE ORAL at 13:14

## 2021-12-10 RX ADMIN — PROPRANOLOL HYDROCHLORIDE 10 MG: 10 TABLET ORAL at 13:14

## 2021-12-10 RX ADMIN — POLYETHYLENE GLYCOL 3350 17 G: 17 POWDER, FOR SOLUTION ORAL at 13:22

## 2021-12-10 RX ADMIN — ENOXAPARIN SODIUM 30 MG: 100 INJECTION SUBCUTANEOUS at 13:14

## 2021-12-10 RX ADMIN — LEVOTHYROXINE SODIUM 25 MCG: 25 TABLET ORAL at 13:14

## 2021-12-10 RX ADMIN — AMLODIPINE BESYLATE 10 MG: 10 TABLET ORAL at 13:21

## 2021-12-10 RX ADMIN — DOCUSATE SODIUM 50MG AND SENNOSIDES 8.6MG 1 TABLET: 8.6; 5 TABLET, FILM COATED ORAL at 13:14

## 2021-12-10 RX ADMIN — ENOXAPARIN SODIUM 30 MG: 100 INJECTION SUBCUTANEOUS at 21:20

## 2021-12-10 ASSESSMENT — PAIN SCALES - GENERAL
PAINLEVEL_OUTOF10: 2

## 2021-12-10 NOTE — PLAN OF CARE
Problem: Pain:  Goal: Pain level will decrease  Description: Pain level will decrease  Outcome: Ongoing     Problem: Pain:  Goal: Control of acute pain  Description: Control of acute pain  Outcome: Ongoing     Problem:  Activity:  Goal: Ability to tolerate increased activity will improve  Description: Ability to tolerate increased activity will improve  Outcome: Ongoing

## 2021-12-10 NOTE — PROGRESS NOTES
Physical Therapy  Facility/Department: [address] ORTHO/MED SURG  Daily Treatment Note  NAME: Yvon Lau  : 1929  MRN: [id number]    Date of Service: 12/10/2021    Discharge Recommendations:  Patient would benefit from continued therapy after discharge   PT Equipment Recommendations  Equipment Needed: No  Other: Pt owns a RW and writer recommends use of device at all times    Assessment   Body structures, Functions, Activity limitations: Decreased functional mobility ; Decreased strength; Decreased safe awareness; Decreased endurance; Decreased balance  Assessment: Pt with impaired mobility requiring mostly MOD/MAX x2   for most aspects of mobility, BSC transfer and amb to chair 2 ft x2, with 2-3 side steps, did not tolerate, unable to advance legs with out increased assist. Pt stated legs were stuck and like jello. Pt would be unsafe to return to her prior living arrangements and will benefit from further therapy at discharge. Pt will need physical assistance at all times for safe mobility. Prognosis: Good  PT Education: Goals; PT Role; Plan of Care; Transfer Training; Functional Mobility Training; Gait Training; General Safety  REQUIRES PT FOLLOW UP: Yes  Activity Tolerance  Activity Tolerance: Patient limited by fatigue; Patient limited by endurance  Activity Tolerance: Pt did not tolerate ambulation, MAX x2 assist needed for safe transfer to chair     Patient Diagnosis(es): The primary encounter diagnosis was Closed fracture of neck of right femur, initial encounter ([facility] [address] A diagnosis of Fall, initial encounter was also pertinent to this visit. has a past medical history of Anxiety, Cancer ([address] Chronic kidney disease, DM (diabetes mellitus) ([facility] [address] GI bleed, History of blood transfusion, Hypertension, Liver disease, and Osteoarthritis.    has a past surgical history that includes Breast lumpectomy; lymph node dissection; Colonoscopy; Endoscopy, colon, diagnostic; hip surgery (Right, 2021); and hip surgery (Right, 12/8/2021). Restrictions  Restrictions/Precautions  Restrictions/Precautions: Weight Bearing, Fall Risk, General Precautions  Required Braces or Orthoses?: No  Lower Extremity Weight Bearing Restrictions  Right Lower Extremity Weight Bearing: Weight Bearing As Tolerated  Position Activity Restriction  Other position/activity restrictions: s/p R hemiarthroplasty 12/8/21; CTLS clear 12/7/21; 2L NC  Subjective   General  Chart Reviewed: Yes  Response To Previous Treatment: Patient with no complaints from previous session. Family / Caregiver Present: No  Subjective  Subjective: Pt supine in bed and agreeable to therapy with encouragement. RN agreeable to therapy. General Comment  Comments: Pt retired to chair, alarm set, given lunch tray  Pain Screening  Patient Currently in Pain: Denies  Vital Signs  Patient Currently in Pain: Denies       Orientation  Orientation  Overall Orientation Status: Within Functional Limits  Cognition   Cognition  Overall Cognitive Status: Exceptions  Following Commands: Follows multistep commands with increased time  Attention Span: Difficulty attending to directions  Safety Judgement: Decreased awareness of need for assistance; Decreased awareness of need for safety  Problem Solving: Assistance required to generate solutions; Assistance required to identify errors made  Insights: Decreased awareness of deficits  Initiation: Requires cues for some  Sequencing: Requires cues for some  Objective   Bed mobility  Supine to Sit: Unable to assess  Sit to Supine: Unable to assess  Scooting: Minimal assistance (VCs and tacticle cues to scoot back into chair)  Comment: Pt on BSC when therapy joined RN  Transfers  Sit to Stand:  Moderate Assistance  Stand to sit: Moderate Assistance  Comment: Cues for hand placement, pt anxious stating legs are not working, cues for encourgment  Ambulation  Ambulation?: Yes  More Ambulation?: No  Ambulation 1  Surface: level tile  Device: Rolling Walker  Other Apparatus: O2 (2 l)  Assistance: Maximum assistance; 2 Person assistance  Quality of Gait: unsteady, states legs are jello  Gait Deviations: Decreased step length; Decreased step height  Distance: 2ft x2 and 3 side steps  Comments: Pt once stood , instructed to take steps toward chair, max x2, pt stated marked weakness and fatigue in legs.   Stairs/Curb  Stairs?: No     Balance  Posture: Fair  Sitting - Static: Good; -  Sitting - Dynamic: Fair; +  Standing - Static: Poor  Standing - Dynamic: Poor  Comments: standing balance assessed with RW  Exercises  Hamstring Sets: x10 RLE  Quad Sets: x10 RLE  Heelslides: x10 RLE AAROM  Gluteal Sets: x10  Knee Long Arc Quad: x10 RLE  Ankle Pumps: x10 RLE  Comments: Pt performed there ex seated in chair and with B LEs elevated     Goals  Short term goals  Time Frame for Short term goals: 14 visits  Short term goal 1: Pt to ambulate 100ft modified independently with RW WBAT RLE  Short term goal 2: Pt to sit <> stand transfer independently WBAT RLE  Short term goal 3: Pt to demonstrate independent bed mobility  Short term goal 4: Pt to demonstrate good standing balance to decrease risk of falls  Short term goal 5: Pt to tolerate 30 minutes of therapy for endurance    Plan    Plan  Times per week: 1-2x per day; 6-7x per week  Times per day:  (1-2x per day)  Current Treatment Recommendations: Strengthening, ROM, Balance Training, Functional Mobility Training, Transfer Training, Endurance Training, Gait Training, Stair training, Home Exercise Program, Safety Education & Training, Patient/Caregiver Education & Training  Safety Devices  Type of devices: Call light within reach, Left in chair, Chair alarm in place, Gait belt, Nurse notified  Restraints  Initially in place: No     Therapy Time   Individual Concurrent Group Co-treatment   Time In 1140         Time Out 1218         Minutes 38         Timed Code Treatment Minutes: [address], PTA

## 2021-12-10 NOTE — PROGRESS NOTES
peritoneal signs present  EXTREMITY: no cyanosis, clubbing or edema    I/O last 3 completed shifts:  In: -   Out: 900 [Urine:900]    Drain/tube output: In: -   Out: 900 [Urine:900]    LAB:  CBC:   Recent Labs     12/08/21  0541 12/08/21  0541 12/08/21  1858 12/09/21  0716 12/10/21  0600   WBC 7.0  --   --  10.9 9.7   HGB 12.2   < > 12.2 10.9* 10.5*   HCT 35.8*   < > 37.4 34.5* 29.8*   MCV 96.5  --   --  105.2* 94.3     --   --  138 165    < > = values in this interval not displayed. BMP:   Recent Labs     12/08/21  1858 12/09/21  0716 12/10/21  0600    129* 125*   K 3.7 3.8 3.8    96* 91*   CO2 18* 17* 20   BUN 14 16 21   CREATININE 0.77 1.04* 1.07*   GLUCOSE 163* 147* 163*     COAGS:   Recent Labs     12/07/21  1900 12/07/21  1901 12/07/21  2348   APTT 27.1  --  25.8   PROT  --  8.2  --    INR 1.1  --  1.2         Jayme Arenas DO  12/10/21, 7:21 AM       Attending Note      I have reviewed the above GCS note(s) and I either performed the key elements of the medical history and physical exam or was present with the trauma resident when the key elements of the medical history and physical exam were performed. I have discussed the findings, established the care plan and recommendations with the trauma team.  C/o shaking in extremities when reaching for objects. Monitor lytes.   PT to eval.    Corine Villarreal MD  12/10/2021  7:44 AM

## 2021-12-10 NOTE — FLOWSHEET NOTE
Trauma resident, Dr. Loco Jimenez., notified of recent discontinuation of glucose control medication. Pt has history of diabetes. Additionally, 0600 serum Na+ of 125. Pt relates new onset tremors of her hands and arms making it difficult to pick things up. Pt denies previous history of tremors.

## 2021-12-10 NOTE — PROGRESS NOTES
Pt unable to void, attempted several times. Bladder scanned for 675ml. Straight cathed for 650ml. Will continue to monitor.

## 2021-12-10 NOTE — PROGRESS NOTES
Occupational [address]  Occupational Therapy Not Seen Note    DATE: 12/10/2021    NAME: Job [place]  MRN: [id number]   : 1929      Patient not seen this date for Occupational Therapy due to: Other: Pt experiencing mild dizziness along with decreased BUE control this day. Pt displayed difficulty with feeding self. RN aware.         Electronically signed by LUPE Garcia on 12/10/2021 at 9:20 AM

## 2021-12-10 NOTE — PROGRESS NOTES
Orthopedic Progress Note    Patient:  Paco Felipe  YOB: 1929     80 y.o. female    Subjective:  Patient seen and examined at bedside this morning  No complaints or concerns  Only voided 250cc yesterday independently. Bladder scan: 675cc. Straight cath'd this mornincc. Pain controlled, resting comfortably  Denies fever, HA, CP, SOB, N/V, dysuria  PT/OT assessment: ambulated 12ft with RW    Vitals reviewed, afebrile    Objective:   Vitals:    21 2115   BP:    Pulse: 67   Resp:    Temp:    SpO2:      Gen: NAD, cooperative, hard of hearing      Cardiovascular: Regular rate     Respiratory: Chest symmetric, no accessory muscle use     RLE: Optifoam dressing on, which is clean/dry/intact. Tenderness to palpation about the hip. Compartments soft and easily compressible. EHL/FHL/TA/GS complex motor intact. Sural/saphenous/SPN/DPN/plantar nerve distribution SILT. DP and PT pulses 2+ with BCR. Recent Labs     21  2348 21  0037 21  0716   WBC  --    < > 10.9   HGB  --    < > 10.9*   HCT  --    < > 34.5*   PLT  --    < > 138   INR 1.2  --   --    NA  --    < > 129*   K  --    < > 3.8   BUN  --    < > 16   CREATININE  --    < > 1.04*   GLUCOSE  --    < > 147*    < > = values in this interval not displayed.       Anticoag: lovenox  See rec for complete list    Impression/plan: 80 y.o. female who fell from standing height, being seen for:     -Right femoral neck fracture s/p hip hemiarthroplasty, POD#2     -Maintain Optifoam dressings, please contact ortho if issues  -Primary team for medical managment  -OK for diet per ortho standpoint  -WB status: WBAT RLE  -Ice (20 min, 1 hour off) for edema/pain control  -Encourage deep breathing and incentive spirometry use  -Continue to work with PT/OT  -OK for DC per ortho standpoint  -Follow-up with Dr. Pauline Murray on   -Please page ortho with any questions    Armen Bob DO  Orthopedic Surgery Resident, PGY-1  [id number] [facility] [address] [facility]    PGY-2 Addendum    Patient seen and examined. Agree with subjective and objective portions from Dr. Matheus Weinstein. The patient is a 80 y.o. female who is POD2 from a right hip hemiarthroplasty due to a femoral neck fracture. WBAT to RLE. OK for chemical AC. Ice for pain/swelling. Encourage IS and mobilization with PT. Maintain optifoam dressing on; notify ortho if saturated. No further surgery per ortho. Plan for outpatient follow up when discharged.      Electronically signed by BriteHubDO 6:32 AM 12/10/2021

## 2021-12-11 LAB
ABSOLUTE EOS #: 0.3 K/UL (ref 0–0.44)
ABSOLUTE IMMATURE GRANULOCYTE: 0.03 K/UL (ref 0–0.3)
ABSOLUTE LYMPH #: 0.87 K/UL (ref 1.1–3.7)
ABSOLUTE MONO #: 0.76 K/UL (ref 0.1–1.2)
ANION GAP SERPL CALCULATED.3IONS-SCNC: 12 MMOL/L (ref 9–17)
BASOPHILS # BLD: 1 % (ref 0–2)
BASOPHILS ABSOLUTE: 0.04 K/UL (ref 0–0.2)
BUN BLDV-MCNC: 24 MG/DL (ref 8–23)
BUN/CREAT BLD: ABNORMAL (ref 9–20)
CALCIUM SERPL-MCNC: 8.4 MG/DL (ref 8.6–10.4)
CHLORIDE BLD-SCNC: 93 MMOL/L (ref 98–107)
CO2: 20 MMOL/L (ref 20–31)
CREAT SERPL-MCNC: 0.8 MG/DL (ref 0.5–0.9)
DIFFERENTIAL TYPE: ABNORMAL
EOSINOPHILS RELATIVE PERCENT: 5 % (ref 1–4)
GFR AFRICAN AMERICAN: >60 ML/MIN
GFR NON-AFRICAN AMERICAN: >60 ML/MIN
GFR SERPL CREATININE-BSD FRML MDRD: ABNORMAL ML/MIN/{1.73_M2}
GFR SERPL CREATININE-BSD FRML MDRD: ABNORMAL ML/MIN/{1.73_M2}
GLUCOSE BLD-MCNC: 114 MG/DL (ref 65–105)
GLUCOSE BLD-MCNC: 136 MG/DL (ref 65–105)
GLUCOSE BLD-MCNC: 147 MG/DL (ref 70–99)
GLUCOSE BLD-MCNC: 151 MG/DL (ref 65–105)
GLUCOSE BLD-MCNC: 193 MG/DL (ref 65–105)
HCT VFR BLD CALC: 28.1 % (ref 36.3–47.1)
HEMOGLOBIN: 9.7 G/DL (ref 11.9–15.1)
IMMATURE GRANULOCYTES: 1 %
LYMPHOCYTES # BLD: 13 % (ref 24–43)
MCH RBC QN AUTO: 32.9 PG (ref 25.2–33.5)
MCHC RBC AUTO-ENTMCNC: 34.5 G/DL (ref 28.4–34.8)
MCV RBC AUTO: 95.3 FL (ref 82.6–102.9)
MONOCYTES # BLD: 12 % (ref 3–12)
NRBC AUTOMATED: 0 PER 100 WBC
PDW BLD-RTO: 14.2 % (ref 11.8–14.4)
PLATELET # BLD: 162 K/UL (ref 138–453)
PLATELET ESTIMATE: ABNORMAL
PMV BLD AUTO: 10.1 FL (ref 8.1–13.5)
POTASSIUM SERPL-SCNC: 3.6 MMOL/L (ref 3.7–5.3)
RBC # BLD: 2.95 M/UL (ref 3.95–5.11)
RBC # BLD: ABNORMAL 10*6/UL
SEG NEUTROPHILS: 68 % (ref 36–65)
SEGMENTED NEUTROPHILS ABSOLUTE COUNT: 4.49 K/UL (ref 1.5–8.1)
SODIUM BLD-SCNC: 125 MMOL/L (ref 135–144)
SODIUM BLD-SCNC: 126 MMOL/L (ref 135–144)
WBC # BLD: 6.5 K/UL (ref 3.5–11.3)
WBC # BLD: ABNORMAL 10*3/UL

## 2021-12-11 PROCEDURE — 6360000002 HC RX W HCPCS: Performed by: STUDENT IN AN ORGANIZED HEALTH CARE EDUCATION/TRAINING PROGRAM

## 2021-12-11 PROCEDURE — 6370000000 HC RX 637 (ALT 250 FOR IP): Performed by: STUDENT IN AN ORGANIZED HEALTH CARE EDUCATION/TRAINING PROGRAM

## 2021-12-11 PROCEDURE — 82947 ASSAY GLUCOSE BLOOD QUANT: CPT

## 2021-12-11 PROCEDURE — 80048 BASIC METABOLIC PNL TOTAL CA: CPT

## 2021-12-11 PROCEDURE — 84295 ASSAY OF SERUM SODIUM: CPT

## 2021-12-11 PROCEDURE — 97110 THERAPEUTIC EXERCISES: CPT

## 2021-12-11 PROCEDURE — 97116 GAIT TRAINING THERAPY: CPT

## 2021-12-11 PROCEDURE — 36415 COLL VENOUS BLD VENIPUNCTURE: CPT

## 2021-12-11 PROCEDURE — 85025 COMPLETE CBC W/AUTO DIFF WBC: CPT

## 2021-12-11 PROCEDURE — 1200000000 HC SEMI PRIVATE

## 2021-12-11 PROCEDURE — 97530 THERAPEUTIC ACTIVITIES: CPT

## 2021-12-11 RX ORDER — VALSARTAN 160 MG/1
320 TABLET ORAL DAILY
Status: DISCONTINUED | OUTPATIENT
Start: 2021-12-11 | End: 2021-12-13 | Stop reason: HOSPADM

## 2021-12-11 RX ORDER — POTASSIUM CHLORIDE 20 MEQ/1
40 TABLET, EXTENDED RELEASE ORAL ONCE
Status: COMPLETED | OUTPATIENT
Start: 2021-12-11 | End: 2021-12-11

## 2021-12-11 RX ORDER — POTASSIUM CHLORIDE 750 MG/1
10 CAPSULE, EXTENDED RELEASE ORAL DAILY
Status: DISCONTINUED | OUTPATIENT
Start: 2021-12-11 | End: 2021-12-13 | Stop reason: HOSPADM

## 2021-12-11 RX ORDER — ALPRAZOLAM 0.5 MG/1
0.5 TABLET ORAL NIGHTLY PRN
Status: DISCONTINUED | OUTPATIENT
Start: 2021-12-11 | End: 2021-12-13 | Stop reason: HOSPADM

## 2021-12-11 RX ADMIN — METOPROLOL TARTRATE 50 MG: 50 TABLET, FILM COATED ORAL at 08:35

## 2021-12-11 RX ADMIN — POTASSIUM CHLORIDE 40 MEQ: 1500 TABLET, EXTENDED RELEASE ORAL at 08:35

## 2021-12-11 RX ADMIN — VALSARTAN 320 MG: 160 TABLET, FILM COATED ORAL at 16:29

## 2021-12-11 RX ADMIN — HYDROCHLOROTHIAZIDE 25 MG: 25 TABLET ORAL at 08:35

## 2021-12-11 RX ADMIN — ACETAMINOPHEN 1000 MG: 500 TABLET ORAL at 13:32

## 2021-12-11 RX ADMIN — AMLODIPINE BESYLATE 10 MG: 10 TABLET ORAL at 08:35

## 2021-12-11 RX ADMIN — ACETAMINOPHEN 1000 MG: 500 TABLET ORAL at 06:47

## 2021-12-11 RX ADMIN — METHOCARBAMOL TABLETS 750 MG: 750 TABLET, COATED ORAL at 16:29

## 2021-12-11 RX ADMIN — LEVOTHYROXINE SODIUM 25 MCG: 25 TABLET ORAL at 08:35

## 2021-12-11 RX ADMIN — ENOXAPARIN SODIUM 30 MG: 100 INJECTION SUBCUTANEOUS at 08:35

## 2021-12-11 RX ADMIN — ACETAMINOPHEN 1000 MG: 500 TABLET ORAL at 22:40

## 2021-12-11 RX ADMIN — ENOXAPARIN SODIUM 30 MG: 100 INJECTION SUBCUTANEOUS at 22:40

## 2021-12-11 ASSESSMENT — PAIN SCALES - GENERAL
PAINLEVEL_OUTOF10: 3
PAINLEVEL_OUTOF10: 3
PAINLEVEL_OUTOF10: 0
PAINLEVEL_OUTOF10: 2
PAINLEVEL_OUTOF10: 8

## 2021-12-11 NOTE — PROGRESS NOTES
Physical Therapy  Facility/Department: [address] ORTHO/MED SURG  Daily Treatment Note  NAME: Kvng Amin  : 1929  MRN: [id number]    Date of Service: 2021    Discharge Recommendations:  Patient would benefit from continued therapy after discharge   PT Equipment Recommendations  Equipment Needed: No  Other: Pt owns a RW and writer recommends use of device at all times    Assessment   Body structures, Functions, Activity limitations: Decreased functional mobility ; Decreased strength; Decreased safe awareness; Decreased endurance; Decreased balance  Assessment: Pt completed sup<> sit modA, sit<>stand from elevated EOB and raised commode w/modA, completed stand pivot and ambulated 12 ft X1 w/2WW requiring modA, slow pace, antalgic gait w/short/shuffling steps. She would benefit from further PT in acute setting and post d/c facilitating return to IND mobility. Prognosis: Good  Decision Making: Medium Complexity  REQUIRES PT FOLLOW UP: Yes  Activity Tolerance  Activity Tolerance: Patient limited by fatigue; Patient limited by endurance  Activity Tolerance: Pt did not tolerate ambulation, MAX x2 assist needed for safe transfer to chair     Patient Diagnosis(es): The primary encounter diagnosis was Closed fracture of neck of right femur, initial encounter ([facility] [address] A diagnosis of Fall, initial encounter was also pertinent to this visit. has a past medical history of Anxiety, Cancer ([facility] [address] Chronic kidney disease, DM (diabetes mellitus) ([facility] [address] GI bleed, History of blood transfusion, Hypertension, Liver disease, and Osteoarthritis. has a past surgical history that includes Breast lumpectomy; lymph node dissection; Colonoscopy; Endoscopy, colon, diagnostic; hip surgery (Right, 2021); and hip surgery (Right, 2021).     Restrictions  Restrictions/Precautions  Restrictions/Precautions: Weight Bearing, Fall Risk, General Precautions  Required Braces or Orthoses?: No  Lower Extremity Weight Bearing Restrictions  Right Lower Extremity Weight Bearing: Weight Bearing As Tolerated  Position Activity Restriction  Other position/activity restrictions: s/p R hemiarthroplasty 12/8/21; CTLS clear 12/7/21  Subjective   General  Response To Previous Treatment: Patient with no complaints from previous session. Family / Caregiver Present: No  Subjective  Subjective: Pt supine in bed and agreeable to therapy with encouragement. RN agreeable to therapy. General Comment  Comments: Pt retired to bed, call light in reach  Pain Screening  Patient Currently in Pain: Denies  Pain Assessment  Pain Level: 3  Vital Signs  Patient Currently in Pain: Denies       Orientation  Overall Orientation Status: Within Normal Limits     Transfers  Sit to Stand: Moderate Assistance  Stand to sit: Moderate Assistance  Stand Pivot Transfers: Moderate Assistance  Comment: Cues for hand placement, pt reports her LE's feel much stronger. Ambulation  Ambulation?: Yes  Ambulation 1  Surface: level tile  Device: Rolling Walker  Assistance: Moderate assistance  Quality of Gait: step to gait pattern leading with RLE, short/shuffling steps, very slow pace  Gait Deviations: Decreased step length; Decreased step height  Distance: 12 ft X 1  Comments: Pt completed stand pivot t/f bed-commode, then commode to opposite side of EOB using 2WW for support.     Stairs/Curb  Stairs?: No     Balance  Posture: Fair  Sitting - Static: Good; -  Sitting - Dynamic: Fair; +  Standing - Static: Poor  Standing - Dynamic: Poor  Comments: standing balance assessed with RW  Exercises  Comments: Pt completed supine BLE SLE's AAROM X 10, BLE modified leg press w/manually resisted extension X 10     Goals  Short term goals  Time Frame for Short term goals: 14 visits  Short term goal 1: Pt to ambulate 100ft modified independently with RW WBAT RLE  Short term goal 2: Pt to sit <> stand transfer independently WBAT RLE  Short term goal 3: Pt to demonstrate independent bed mobility  Short term goal 4: Pt to demonstrate good standing balance to decrease risk of falls  Short term goal 5: Pt to tolerate 30 minutes of therapy for endurance    Plan   Times per week: 1-2x per day; 6-7x per week  Times per day:  (1-2x per day)  Current Treatment Recommendations: Strengthening, ROM, Balance Training, Functional Mobility Training, Transfer Training, Endurance Training, Gait Training, Stair training, Home Exercise Program, Safety Education & Training, Patient/Caregiver Education & Training  Safety Devices  Type of devices: Call light within reach, Left in chair, Chair alarm in place, Gait belt, Nurse notified  Restraints  Initially in place: No     Therapy Time   Individual Concurrent Group Co-treatment   Time In  1530         Time Out  1610         Minutes  40                 GONZALEZ Shepherd, PTA

## 2021-12-11 NOTE — CARE COORDINATION
Transitional planning:  Spoke with Lona Tyler RN from trauma, states pt might be discharged today. [address] Ana Mcmillan of [facility] and left message with admissions at [phone] notifying that pt could be discharged today. [address] [facility] called from [place] of [facility]. Need covid test and no HENS needed. Will start precert today.

## 2021-12-11 NOTE — PLAN OF CARE
Problem: Falls - Risk of:  Goal: Will remain free from falls  Description: Will remain free from falls  12/11/2021 1842 by Walt Biggs RN  Outcome: Met This Shift  12/11/2021 0508 by Luis Bacon RN  Outcome: Ongoing  Goal: Absence of physical injury  Description: Absence of physical injury  12/11/2021 1842 by Walt Biggs RN  Outcome: Met This Shift  12/11/2021 0508 by Luis Bacon RN  Outcome: Ongoing     Problem: Pain:  Goal: Pain level will decrease  Description: Pain level will decrease  12/11/2021 1842 by Walt Biggs RN  Outcome: Ongoing  12/11/2021 0508 by Luis Bacon RN  Outcome: Ongoing  Goal: Control of acute pain  Description: Control of acute pain  12/11/2021 1842 by Walt Biggs RN  Outcome: Ongoing  12/11/2021 0508 by Luis Bacon RN  Outcome: Ongoing  Goal: Control of chronic pain  Description: Control of chronic pain  12/11/2021 1842 by Walt Biggs RN  Outcome: Ongoing  12/11/2021 0508 by Luis Baocn RN  Outcome: Ongoing     Problem:  Activity:  Goal: Ability to tolerate increased activity will improve  Description: Ability to tolerate increased activity will improve  12/11/2021 1842 by Walt Biggs RN  Outcome: Ongoing  12/11/2021 0508 by Luis Bacon RN  Outcome: Ongoing     Problem: Musculor/Skeletal Functional Status  Goal: Highest potential functional level  12/11/2021 1842 by Walt Biggs RN  Outcome: Ongoing  12/11/2021 0508 by Luis Bacon RN  Outcome: Ongoing     Problem: Skin Integrity:  Goal: Will show no infection signs and symptoms  Description: Will show no infection signs and symptoms  Outcome: Ongoing  Goal: Absence of new skin breakdown  Description: Absence of new skin breakdown  Outcome: Ongoing

## 2021-12-11 NOTE — PLAN OF CARE
Problem: Pain:  Goal: Pain level will decrease  Description: Pain level will decrease  Outcome: Ongoing  Goal: Control of acute pain  Description: Control of acute pain  Outcome: Ongoing  Goal: Control of chronic pain  Description: Control of chronic pain  Outcome: Ongoing     Problem:  Activity:  Goal: Ability to tolerate increased activity will improve  Description: Ability to tolerate increased activity will improve  Outcome: Ongoing     Problem: Falls - Risk of:  Goal: Will remain free from falls  Description: Will remain free from falls  Outcome: Ongoing  Goal: Absence of physical injury  Description: Absence of physical injury  Outcome: Ongoing     Problem: Musculor/Skeletal Functional Status  Goal: Highest potential functional level  Outcome: Ongoing

## 2021-12-11 NOTE — PROGRESS NOTES
PROGRESS NOTE          PATIENT NAME: [facility] RECORD NO. [id number]  DATE: 2021  SURGEON: Dr. Agustin Flaming: Liya Oates MD    HD: # 4    ASSESSMENT    Patient Active Problem List   Diagnosis    Chronic kidney disease, stage III (moderate) ([address]    Essential hypertension    Type 2 diabetes mellitus without complication (HCC)    Microalbuminuria    Hypovitaminosis D    Closed subcapital fracture of femur, right, initial encounter [facility])       MEDICAL DECISION MAKING AND PLAN    27-year-old female status post fall from standing height, postop day 3 status post right femoral neck fracture repair with hemiarthroplasty    Patient was seen examined bedside this morning. General diet as tolerated  Pain control-Multimodal  PT/OT  Encourage patient to be out of bed to ambulate/up to chair  Continue incentive spirometer use  Weightbearing as tolerated to right lower extremity  Continue DVT prophylaxis  Hemoglobin stable-9.7  Continues to have hyponatremia-sodium 125 today, will continue IV fluids. Discharge planning      SUBJECTIVE    Job [place] was seen and examined at bedside this morning. No acute events overnight. Afebrile. Pain well controlled. Tolerating general diet. Out of bed ambulating. Sodium 125 this morning, IV fluids running at 25 mL/h.       OBJECTIVE  VITALS: Temp: Temp: 98.6 °F (37 °C)Temp  Av.1 °F (36.7 °C)  Min: 97.6 °F (36.4 °C)  Max: 98.6 °F (37 °C) BP Systolic (17NFP), XSD:753 , Min:118 , MCT:528   Diastolic (97AZE), OBK:34, Min:53, Max:61   Pulse Pulse  Av.8  Min: 64  Max: 73 Resp Resp  Av  Min: 18  Max: 20 Pulse ox SpO2  Av.3 %  Min: 93 %  Max: 96 %  GENERAL: alert, no distress  NEURO: Cranial nerves II through XII intact, no focal deficits noted  HEENT: Neck supple, trachea midline  LUNGS: clear to ausculation, without wheezes, rales or rhonci  HEART: normal rate and regular rhythm  ABDOMEN: soft, non-tender, non-distended,

## 2021-12-12 LAB
ANION GAP SERPL CALCULATED.3IONS-SCNC: 11 MMOL/L (ref 9–17)
BUN BLDV-MCNC: 22 MG/DL (ref 8–23)
BUN/CREAT BLD: ABNORMAL (ref 9–20)
CALCIUM SERPL-MCNC: 8.6 MG/DL (ref 8.6–10.4)
CHLORIDE BLD-SCNC: 94 MMOL/L (ref 98–107)
CO2: 20 MMOL/L (ref 20–31)
CREAT SERPL-MCNC: 0.74 MG/DL (ref 0.5–0.9)
GFR AFRICAN AMERICAN: >60 ML/MIN
GFR NON-AFRICAN AMERICAN: >60 ML/MIN
GFR SERPL CREATININE-BSD FRML MDRD: ABNORMAL ML/MIN/{1.73_M2}
GFR SERPL CREATININE-BSD FRML MDRD: ABNORMAL ML/MIN/{1.73_M2}
GLUCOSE BLD-MCNC: 138 MG/DL (ref 70–99)
GLUCOSE BLD-MCNC: 144 MG/DL (ref 65–105)
GLUCOSE BLD-MCNC: 193 MG/DL (ref 65–105)
GLUCOSE BLD-MCNC: 248 MG/DL (ref 65–105)
POTASSIUM SERPL-SCNC: 3.8 MMOL/L (ref 3.7–5.3)
SODIUM BLD-SCNC: 125 MMOL/L (ref 135–144)
SODIUM BLD-SCNC: 129 MMOL/L (ref 135–144)

## 2021-12-12 PROCEDURE — 97535 SELF CARE MNGMENT TRAINING: CPT

## 2021-12-12 PROCEDURE — 6370000000 HC RX 637 (ALT 250 FOR IP): Performed by: STUDENT IN AN ORGANIZED HEALTH CARE EDUCATION/TRAINING PROGRAM

## 2021-12-12 PROCEDURE — 80048 BASIC METABOLIC PNL TOTAL CA: CPT

## 2021-12-12 PROCEDURE — 36415 COLL VENOUS BLD VENIPUNCTURE: CPT

## 2021-12-12 PROCEDURE — 1200000000 HC SEMI PRIVATE

## 2021-12-12 PROCEDURE — 82947 ASSAY GLUCOSE BLOOD QUANT: CPT

## 2021-12-12 PROCEDURE — 6360000002 HC RX W HCPCS: Performed by: STUDENT IN AN ORGANIZED HEALTH CARE EDUCATION/TRAINING PROGRAM

## 2021-12-12 PROCEDURE — 97530 THERAPEUTIC ACTIVITIES: CPT

## 2021-12-12 PROCEDURE — 97116 GAIT TRAINING THERAPY: CPT

## 2021-12-12 PROCEDURE — 84295 ASSAY OF SERUM SODIUM: CPT

## 2021-12-12 PROCEDURE — 97110 THERAPEUTIC EXERCISES: CPT

## 2021-12-12 RX ORDER — SODIUM CHLORIDE 1000 MG
2 TABLET, SOLUBLE MISCELLANEOUS ONCE
Status: COMPLETED | OUTPATIENT
Start: 2021-12-13 | End: 2021-12-13

## 2021-12-12 RX ORDER — FUROSEMIDE 10 MG/ML
20 INJECTION INTRAMUSCULAR; INTRAVENOUS ONCE
Status: COMPLETED | OUTPATIENT
Start: 2021-12-12 | End: 2021-12-12

## 2021-12-12 RX ORDER — SODIUM CHLORIDE 1000 MG
2 TABLET, SOLUBLE MISCELLANEOUS ONCE
Status: COMPLETED | OUTPATIENT
Start: 2021-12-12 | End: 2021-12-12

## 2021-12-12 RX ADMIN — METOPROLOL TARTRATE 50 MG: 50 TABLET, FILM COATED ORAL at 08:42

## 2021-12-12 RX ADMIN — ACETAMINOPHEN 1000 MG: 500 TABLET ORAL at 14:10

## 2021-12-12 RX ADMIN — DOCUSATE SODIUM 50MG AND SENNOSIDES 8.6MG 1 TABLET: 8.6; 5 TABLET, FILM COATED ORAL at 08:45

## 2021-12-12 RX ADMIN — HYDROCHLOROTHIAZIDE 25 MG: 25 TABLET ORAL at 08:43

## 2021-12-12 RX ADMIN — VALSARTAN 320 MG: 160 TABLET, FILM COATED ORAL at 08:44

## 2021-12-12 RX ADMIN — PANTOPRAZOLE SODIUM 40 MG: 40 TABLET, DELAYED RELEASE ORAL at 08:42

## 2021-12-12 RX ADMIN — LEVOTHYROXINE SODIUM 25 MCG: 25 TABLET ORAL at 08:43

## 2021-12-12 RX ADMIN — ALPRAZOLAM 0.5 MG: 0.5 TABLET ORAL at 01:21

## 2021-12-12 RX ADMIN — ACETAMINOPHEN 1000 MG: 500 TABLET ORAL at 07:39

## 2021-12-12 RX ADMIN — FUROSEMIDE 20 MG: 10 INJECTION, SOLUTION INTRAMUSCULAR; INTRAVENOUS at 12:31

## 2021-12-12 RX ADMIN — ACETAMINOPHEN 1000 MG: 500 TABLET ORAL at 21:12

## 2021-12-12 RX ADMIN — SODIUM CHLORIDE TAB 1 GM 2 G: 1 TAB at 08:48

## 2021-12-12 RX ADMIN — ENOXAPARIN SODIUM 30 MG: 100 INJECTION SUBCUTANEOUS at 21:12

## 2021-12-12 RX ADMIN — POTASSIUM CHLORIDE 10 MEQ: 10 CAPSULE, COATED, EXTENDED RELEASE ORAL at 08:42

## 2021-12-12 RX ADMIN — AMLODIPINE BESYLATE 10 MG: 10 TABLET ORAL at 08:43

## 2021-12-12 RX ADMIN — ENOXAPARIN SODIUM 30 MG: 100 INJECTION SUBCUTANEOUS at 08:45

## 2021-12-12 ASSESSMENT — PAIN DESCRIPTION - PAIN TYPE: TYPE: SURGICAL PAIN

## 2021-12-12 ASSESSMENT — PAIN SCALES - PAIN ASSESSMENT IN ADVANCED DEMENTIA (PAINAD)
BREATHING: 0
TOTALSCORE: 0
FACIALEXPRESSION: 0
TOTALSCORE: 0
NEGVOCALIZATION: 0
FACIALEXPRESSION: 0
CONSOLABILITY: 0
FACIALEXPRESSION: 0
CONSOLABILITY: 0
BODYLANGUAGE: 0
NEGVOCALIZATION: 0
BREATHING: 0
TOTALSCORE: 0
NEGVOCALIZATION: 0
BODYLANGUAGE: 0
CONSOLABILITY: 0
CONSOLABILITY: 0
BREATHING: 0
CONSOLABILITY: 0
FACIALEXPRESSION: 0
BREATHING: 0
FACIALEXPRESSION: 0
BODYLANGUAGE: 0
TOTALSCORE: 0
BODYLANGUAGE: 0
BODYLANGUAGE: 0
NEGVOCALIZATION: 0
BREATHING: 0
NEGVOCALIZATION: 0
NEGVOCALIZATION: 0
TOTALSCORE: 0
BREATHING: 0
NEGVOCALIZATION: 0
TOTALSCORE: 0
TOTALSCORE: 0
FACIALEXPRESSION: 0
CONSOLABILITY: 0
FACIALEXPRESSION: 0
BODYLANGUAGE: 0
BREATHING: 0
CONSOLABILITY: 0
BODYLANGUAGE: 0

## 2021-12-12 ASSESSMENT — PAIN SCALES - GENERAL
PAINLEVEL_OUTOF10: 0
PAINLEVEL_OUTOF10: 9
PAINLEVEL_OUTOF10: 6
PAINLEVEL_OUTOF10: 0
PAINLEVEL_OUTOF10: 9
PAINLEVEL_OUTOF10: 8
PAINLEVEL_OUTOF10: 5
PAINLEVEL_OUTOF10: 0

## 2021-12-12 ASSESSMENT — PAIN DESCRIPTION - ONSET: ONSET: ON-GOING

## 2021-12-12 ASSESSMENT — PAIN DESCRIPTION - FREQUENCY: FREQUENCY: CONTINUOUS

## 2021-12-12 ASSESSMENT — PAIN DESCRIPTION - LOCATION: LOCATION: HIP

## 2021-12-12 ASSESSMENT — PAIN DESCRIPTION - DESCRIPTORS: DESCRIPTORS: SHARP

## 2021-12-12 ASSESSMENT — PAIN DESCRIPTION - ORIENTATION: ORIENTATION: RIGHT

## 2021-12-12 ASSESSMENT — PAIN DESCRIPTION - DIRECTION: RADIATING_TOWARDS: RIGHT LEG

## 2021-12-12 NOTE — PROGRESS NOTES
Physical Therapy  Facility/Department: [address] ORTHO/MED SURG  Daily Treatment Note  NAME: Alexus Magdaleno  : 1929  MRN: [id number]    Date of Service: 2021    Discharge Recommendations:  Patient would benefit from continued therapy after discharge   PT Equipment Recommendations  Equipment Needed: No  Other: Pt owns a RW and writer recommends use of device at all times    Assessment   Body structures, Functions, Activity limitations: Decreased functional mobility ; Decreased strength; Decreased safe awareness; Decreased endurance; Decreased balance  Assessment: The pt required MOD A for bed mobility this PM; able to amb 50ft with RW requiring MOD A for safety; No LOB /buckling noted. Recomending continue therapy to address deficits  Prognosis: Good  PT Education: Goals; PT Role; Plan of Care; Transfer Training; Functional Mobility Training; Gait Training; General Safety; Home Exercise Program  Patient Education: Pt educated on HEP for strenthening  REQUIRES PT FOLLOW UP: Yes  Activity Tolerance  Activity Tolerance: Patient limited by fatigue; Patient limited by endurance     Patient Diagnosis(es): The primary encounter diagnosis was Closed fracture of neck of right femur, initial encounter ([facility] [address] A diagnosis of Fall, initial encounter was also pertinent to this visit. has a past medical history of Anxiety, Cancer ([facility] [address] Chronic kidney disease, DM (diabetes mellitus) ([facility] [address] GI bleed, History of blood transfusion, Hypertension, Liver disease, and Osteoarthritis. has a past surgical history that includes Breast lumpectomy; lymph node dissection; Colonoscopy; Endoscopy, colon, diagnostic; hip surgery (Right, 2021); and hip surgery (Right, 2021).     Restrictions  Restrictions/Precautions  Restrictions/Precautions: Weight Bearing, Fall Risk, General Precautions  Required Braces or Orthoses?: No  Lower Extremity Weight Bearing Restrictions  Right Lower Extremity Weight Bearing: Weight Bearing As Tolerated  Position Activity Restriction  Other position/activity restrictions: s/p R hemiarthroplasty 12/8/21; CTLS clear 12/7/21  Subjective   General  Response To Previous Treatment: Patient with no complaints from previous session. Family / Caregiver Present: Yes (grand dtr)  Subjective  Subjective: RN agreeable to therapy. Pt supine in bed and agreeable to therapy with encouragement. Pain Screening  Patient Currently in Pain: No  Vital Signs  Patient Currently in Pain: No       Orientation  Orientation  Overall Orientation Status: Within Normal Limits  Cognition      Objective      Transfers  Sit to Stand: Moderate Assistance  Stand to sit: Moderate Assistance  Bed to Chair: Moderate assistance  Ambulation  Ambulation?: Yes  Ambulation 1  Surface: level tile  Device: Rolling Walker  Assistance: Moderate assistance  Quality of Gait: step to gait pattern leading with RLE, short/shuffling steps, very slow pace  Gait Deviations: Decreased step length; Decreased step height  Distance: 50ft  Comments: significant amount of time to comeplete distance. motivated with Grans dtr cheering her on. Balance  Posture: Fair  Sitting - Static: Good; -  Sitting - Dynamic: Fair; +  Standing - Static: Fair; -  Standing - Dynamic: Poor; +  Comments: standing balance assessed with RW  Exercises  Comments:Seated LE exercise program: Long Arc Quads, hip abduction/adduction, heel/toe raises, and marches.  Reps:  x10          AM-PAC Score     AM-PAC Inpatient Mobility without Stair Climbing Raw Score : 14 (12/12/21 1523)  AM-PAC Inpatient without Stair Climbing T-Scale Score : 40.85 (12/12/21 1523)  Mobility Inpatient CMS 0-100% Score: 53.33 (12/12/21 1523)  Mobility Inpatient without Stair CMS G-Code Modifier : CK (12/12/21 1523)       Goals  Short term goals  Time Frame for Short term goals: 14 visits  Short term goal 1: Pt to ambulate 100ft modified independently with RW WBAT RLE  Short term goal 2: Pt to sit <> stand transfer independently WBAT RLE  Short term goal 3: Pt to demonstrate independent bed mobility  Short term goal 4: Pt to demonstrate good standing balance to decrease risk of falls  Short term goal 5: Pt to tolerate 30 minutes of therapy for endurance    Plan    Plan  Times per week: 1-2x per day; 6-7x per week  Times per day:  (1-2x per day)  Current Treatment Recommendations: Strengthening, ROM, Balance Training, Functional Mobility Training, Transfer Training, Endurance Training, Gait Training, Stair training, Home Exercise Program, Safety Education & Training, Patient/Caregiver Education & Training  Safety Devices  Type of devices: Call light within reach, Chair alarm in place, Gait belt, Nurse notified, Left in bed, All fall risk precautions in place  Restraints  Initially in place: No     Therapy Time   Individual Concurrent Group Co-treatment   Time In 1416         Time Out 1440         Minutes 24         Timed Code Treatment Minutes: 24 Minutes       Sofiya Argueta, PTA

## 2021-12-12 NOTE — PLAN OF CARE
Problem: Falls - Risk of:  Goal: Will remain free from falls  Description: Will remain free from falls  12/12/2021 1845 by Laura Martin RN  Outcome: Met This Shift  12/12/2021 0525 by Kris Cifuentes RN  Outcome: Ongoing  Goal: Absence of physical injury  Description: Absence of physical injury  12/12/2021 1845 by Laura Martin RN  Outcome: Met This Shift  12/12/2021 0525 by Kris Cifuentes RN  Outcome: Ongoing     Problem: Musculor/Skeletal Functional Status  Goal: Highest potential functional level  12/12/2021 1845 by Laura Martin RN  Outcome: Met This Shift  12/12/2021 0525 by Kris Cifuentes RN  Outcome: Ongoing     Problem: Pain:  Goal: Pain level will decrease  Description: Pain level will decrease  12/12/2021 1845 by Laura Martin RN  Outcome: Ongoing  12/12/2021 0525 by Kris Cifuentes RN  Outcome: Ongoing  Goal: Control of acute pain  Description: Control of acute pain  12/12/2021 1845 by Laura Martin RN  Outcome: Ongoing  12/12/2021 0525 by Kris Cifuentes RN  Outcome: Ongoing  Goal: Control of chronic pain  Description: Control of chronic pain  12/12/2021 1845 by Laura Martin RN  Outcome: Ongoing  12/12/2021 0525 by Kris Cifuentes RN  Outcome: Ongoing     Problem:  Activity:  Goal: Ability to tolerate increased activity will improve  Description: Ability to tolerate increased activity will improve  12/12/2021 1845 by Laura Martin RN  Outcome: Ongoing  12/12/2021 0525 by Kris Cifuentes RN  Outcome: Ongoing     Problem: Skin Integrity:  Goal: Will show no infection signs and symptoms  Description: Will show no infection signs and symptoms  12/12/2021 1845 by Laura Martin RN  Outcome: Ongoing  12/12/2021 0525 by Kris Cifuentes RN  Outcome: Ongoing  Goal: Absence of new skin breakdown  Description: Absence of new skin breakdown  12/12/2021 1845 by Laura Martin RN  Outcome: Ongoing  12/12/2021 0525 by Kris Cifuentes RN  Outcome: Ongoing

## 2021-12-12 NOTE — PROGRESS NOTES
Occupational Therapy  Facility/Department: [address] ORTHO/MED SURG  Daily Treatment Note  NAME: Jagdish Garner  : 1929  MRN: [id number]    Date of Service: 2021    Discharge Recommendations:Pt. Would benefit from further skilled OT services to enhance functional outcomes, prior to return home. Patient would benefit from continued therapy after discharge  OT Equipment Recommendations  Equipment Needed: Yes  ADL Assistive Devices: Sock-Aid Hard; Reacher    Assessment   Performance deficits / Impairments: Decreased functional mobility ; Decreased balance; Decreased safe awareness; Decreased cognition; Decreased posture; Decreased ADL status; Decreased endurance; Decreased high-level IADLs; Decreased ROM; Decreased strength  Prognosis: Good  Decision Making: Medium Complexity  Patient Education: Pt ed on OT Role, OT POC, safety awareness, transfer training, and WB precautions. Pt demo fair return. REQUIRES OT FOLLOW UP: Yes  Activity Tolerance  Activity Tolerance: Patient limited by fatigue; Patient Tolerated treatment well  Safety Devices  Safety Devices in place: Yes  Type of devices: Call light within reach; Chair alarm in place; Nurse notified; Left in chair; Gait belt  Restraints  Initially in place: No         Patient Diagnosis(es): The primary encounter diagnosis was Closed fracture of neck of right femur, initial encounter ([facility] [address] A diagnosis of Fall, initial encounter was also pertinent to this visit. has a past medical history of Anxiety, Cancer ([address] Chronic kidney disease, DM (diabetes mellitus) ([facility] [address] GI bleed, History of blood transfusion, Hypertension, Liver disease, and Osteoarthritis. has a past surgical history that includes Breast lumpectomy; lymph node dissection; Colonoscopy; Endoscopy, colon, diagnostic; hip surgery (Right, 2021); and hip surgery (Right, 2021).     Restrictions  Restrictions/Precautions  Restrictions/Precautions: Weight Bearing, Fall Risk, General Precautions  Required Braces or Orthoses?: No  Lower Extremity Weight Bearing Restrictions  Right Lower Extremity Weight Bearing: Weight Bearing As Tolerated  Position Activity Restriction  Other position/activity restrictions: s/p R hemiarthroplasty 12/8/21; CTLS clear 12/7/21  Subjective   General  Patient assessed for rehabilitation services?: Yes  Family / Caregiver Present: No  General Comment  Comments: RN ok'd pt for OT tx this date. Pt agreeable and pleasant/cooperative throughout. Vital Signs  Patient Currently in Pain: No (Pain med's given prior to tx.)   Orientation  Orientation  Overall Orientation Status: Within Functional Limits  Objective    ADL  UE Dressing: Stand by assistance; Setup; Increased time to complete  Toileting: Increased time to complete; Moderate assistance  Additional Comments: Pt. resting in bed upon arrival. Pt. agreeable to OT services. Pt. transferred to EOB for UB dressing. UB dressing- SBA to tho gown over backside. EOB->BSC- CGA + RW, Frontal hygiene- (standing) CGA + 1 UE support on RW + set up. Pt. declined further ADLs. Balance  Sitting Balance: Contact guard assistance (CGA-SBA while engaged in ADL activity. ~5 minutes)  Standing Balance: Contact guard assistance  Standing Balance  Time: ~10 minutes  Activity: standing EOB in prep for functional mobility, toileting  Comment: RW, verbal cues for B hand placement and to keep upright position. Pt. demo G carryover once cued. Functional Mobility  Functional - Mobility Device: Rolling Walker  Activity: Other (~15 feet to recliner chair.)  Assist Level: Minimal assistance  Functional Mobility Comments: Min A with RW positioning, CGA for balance  Toilet Transfers  Toilet - Technique: Stand pivot  Equipment Used: Standard bedside commode  Toilet Transfer: Minimal assistance  Bed mobility  Supine to Sit: Maximum assistance  Scooting: Moderate assistance  Comment: Max A with B LE progression.   Transfers  Sit to stand: Moderate assistance; Minimal assistance (x1)  Stand to sit: Minimal assistance (x1)  Transfer Comments: Mod VCs for proper hand placement on RW. EOB->BSC->EOB->mobility->Recliner chair. Cognition  Overall Cognitive Status: Exceptions  Following Commands: Follows multistep commands consistently  Attention Span: Appears intact  Safety Judgement: Decreased awareness of need for assistance; Decreased awareness of need for safety  Problem Solving: Assistance required to generate solutions; Assistance required to identify errors made  Insights: Decreased awareness of deficits  Initiation: Does not require cues  Sequencing: Does not require cues  Cognition Comment: Needing many verbal cues for B hand placement during transfers, pt. demo F carryover.                                          Plan   Plan  Times per week: 3-5x/wk  Current Treatment Recommendations: Pain Management, Patient/Caregiver Education & Training, Safety Education & Training, Self-Care / ADL, Cognitive/Perceptual Training, Home Management Training, Equipment Evaluation, Education, & procurement, Functional Mobility Training, Endurance Training, Balance Training                                               AM-PAC Score        AM-PAC Inpatient Daily Activity Raw Score: 18 (12/12/21 0953)  AM-PAC Inpatient ADL T-Scale Score : 38.66 (12/12/21 0953)  ADL Inpatient CMS 0-100% Score: 46.65 (12/12/21 0953)  ADL Inpatient CMS G-Code Modifier : CK (12/12/21 0953)    Goals  Short term goals  Time Frame for Short term goals: By discharge, pt will:  Short term goal 1: demo functional transfers/mobility with SBA and LRD with good safety awareness  Short term goal 2: demo LB ADL with Min A and AE PRN  Short term goal 3: demo dynamic standing and reaching outside DAVID for 15+ minutes with SBA to increase balance and activity tolerance for participation in ADLs  Short term goal 4: demo UB ADL with SUP  Short term goal 5: utilize 2 EC/WS techs during functional activities with <2 VCs  Short term goal 6: retrieve and transport 5 items around room at different heights with CGA to increase endurance and balance for participation in ADLs       Therapy Time   Individual Concurrent Group Co-treatment   Time In 0905         Time Out 0950         Minutes 45         Timed Code Treatment Minutes: [address], TAYLOR/L

## 2021-12-12 NOTE — PROGRESS NOTES
PROGRESS NOTE      PATIENT NAME: Carol [facility] RECORD NO. [id number]  DATE: 2021  SURGEON: Dr. Porsha Evans: Christelle Carmona MD    HD: # 5    ASSESSMENT    Patient Active Problem List   Diagnosis    Chronic kidney disease, stage III (moderate) ([address]    Essential hypertension    Type 2 diabetes mellitus without complication (HCC)    Microalbuminuria    Hypovitaminosis D    Closed subcapital fracture of femur, right, initial encounter [facility])       MEDICAL DECISION MAKING AND PLAN    27-year-old female status post fall with right femur fracture, status post right hip hemiarthroplasty on     Patient was seen and examined at bedside this morning. General diet as tolerated, fluid restriction, less than 1000 mL  Labs reviewed, sodium continues to be low, 125 today. Will start salt tabs, continue normal saline at 25 mL/h. Pain control-Multimodal  PT/OT  Encourage patient to use her incentive spirometer  DVT prophylaxis  Discharge planning        MERLYN Hwang was seen and examined at bedside this morning. No acute events overnight. Afebrile. Resting comfortable in bed. Patient reports she slept well. Pain well controlled. Tolerating general diet. Sodium continues to be low-125 today. Patient with no other complaints.       OBJECTIVE  VITALS: Temp: Temp: 98.2 °F (36.8 °C)Temp  Av.4 °F (36.9 °C)  Min: 98.2 °F (36.8 °C)  Max: 98.6 °F (37 °C) BP Systolic (82HSE), XHP:191 , Min:132 , XWX:426   Diastolic (94IWB), SKJ:94, Min:53, Max:59   Pulse No data recorded Resp Resp  Av  Min: 20  Max: 20 Pulse ox SpO2  Av %  Min: 94 %  Max: 94 %  GENERAL: alert, no distress  NEURO: Cranial nerves II through XII intact, no focal deficits  HEENT: Neck supple, trachea midline  LUNGS: clear to ausculation, without wheezes, rales or rhonci  HEART: normal rate and regular rhythm  ABDOMEN: soft, non-tender, non-distended, bowel sounds present in all 4 quadrants and no guarding or peritoneal signs present  EXTREMITY: no cyanosis, clubbing or edema    I/O last 3 completed shifts:  In: -   Out: 300 [Urine:300]    Drain/tube output:  No intake/output data recorded. LAB:  CBC:   Recent Labs     12/09/21  0716 12/10/21  0600 12/11/21  0624   WBC 10.9 9.7 6.5   HGB 10.9* 10.5* 9.7*   HCT 34.5* 29.8* 28.1*   .2* 94.3 95.3    165 162     BMP:   Recent Labs     12/10/21  1958 12/10/21  1958 12/11/21  0624 12/11/21  1612 12/12/21  0426   *   < > 125* 126* 125*   K 3.6*  --  3.6*  --  3.8   CL 90*  --  93*  --  94*   CO2 20  --  20  --  20   BUN 23  --  24*  --  22   CREATININE 1.02*  --  0.80  --  0.74   GLUCOSE 158*  --  147*  --  138*    < > = values in this interval not displayed. COAGS: No results for input(s): APTT, PROT, INR in the last 72 hours. Delaney Capellan DO  12/12/21, 6:32 AM       Attending Note      I have reviewed the above GCS note(s) and I either performed the key elements of the medical history and physical exam or was present with the trauma resident when the key elements of the medical history and physical exam were performed. I have discussed the findings, established the care plan and recommendations with the trauma team.  Sodium remains at 125. Mentation stable. DC planning.     Faisal Hurtado MD  12/12/2021  7:35 AM

## 2021-12-13 VITALS
OXYGEN SATURATION: 96 % | HEART RATE: 82 BPM | SYSTOLIC BLOOD PRESSURE: 147 MMHG | DIASTOLIC BLOOD PRESSURE: 54 MMHG | TEMPERATURE: 99.2 F | BODY MASS INDEX: 30.9 KG/M2 | HEIGHT: 64 IN | RESPIRATION RATE: 19 BRPM | WEIGHT: 181 LBS

## 2021-12-13 LAB
ANION GAP SERPL CALCULATED.3IONS-SCNC: 14 MMOL/L (ref 9–17)
BUN BLDV-MCNC: 19 MG/DL (ref 8–23)
BUN/CREAT BLD: ABNORMAL (ref 9–20)
CALCIUM SERPL-MCNC: 9 MG/DL (ref 8.6–10.4)
CHLORIDE BLD-SCNC: 98 MMOL/L (ref 98–107)
CO2: 20 MMOL/L (ref 20–31)
CREAT SERPL-MCNC: 0.75 MG/DL (ref 0.5–0.9)
GFR AFRICAN AMERICAN: >60 ML/MIN
GFR NON-AFRICAN AMERICAN: >60 ML/MIN
GFR SERPL CREATININE-BSD FRML MDRD: ABNORMAL ML/MIN/{1.73_M2}
GFR SERPL CREATININE-BSD FRML MDRD: ABNORMAL ML/MIN/{1.73_M2}
GLUCOSE BLD-MCNC: 131 MG/DL (ref 65–105)
GLUCOSE BLD-MCNC: 139 MG/DL (ref 70–99)
POTASSIUM SERPL-SCNC: 3.6 MMOL/L (ref 3.7–5.3)
SARS-COV-2, RAPID: NOT DETECTED
SODIUM BLD-SCNC: 132 MMOL/L (ref 135–144)
SPECIMEN DESCRIPTION: NORMAL

## 2021-12-13 PROCEDURE — 6370000000 HC RX 637 (ALT 250 FOR IP): Performed by: STUDENT IN AN ORGANIZED HEALTH CARE EDUCATION/TRAINING PROGRAM

## 2021-12-13 PROCEDURE — 87635 SARS-COV-2 COVID-19 AMP PRB: CPT

## 2021-12-13 PROCEDURE — 97110 THERAPEUTIC EXERCISES: CPT

## 2021-12-13 PROCEDURE — 6360000002 HC RX W HCPCS: Performed by: STUDENT IN AN ORGANIZED HEALTH CARE EDUCATION/TRAINING PROGRAM

## 2021-12-13 PROCEDURE — 97530 THERAPEUTIC ACTIVITIES: CPT

## 2021-12-13 PROCEDURE — 97116 GAIT TRAINING THERAPY: CPT

## 2021-12-13 PROCEDURE — 36415 COLL VENOUS BLD VENIPUNCTURE: CPT

## 2021-12-13 PROCEDURE — 80048 BASIC METABOLIC PNL TOTAL CA: CPT

## 2021-12-13 PROCEDURE — 82947 ASSAY GLUCOSE BLOOD QUANT: CPT

## 2021-12-13 RX ORDER — POLYETHYLENE GLYCOL 3350 17 G/17G
17 POWDER, FOR SOLUTION ORAL DAILY
Qty: 30 EACH | Refills: 0
Start: 2021-12-14 | End: 2022-01-13

## 2021-12-13 RX ORDER — SENNA AND DOCUSATE SODIUM 50; 8.6 MG/1; MG/1
1 TABLET, FILM COATED ORAL 2 TIMES DAILY
Qty: 30 TABLET | Refills: 0
Start: 2021-12-13

## 2021-12-13 RX ORDER — ACETAMINOPHEN 500 MG
1000 TABLET ORAL EVERY 8 HOURS SCHEDULED
Qty: 42 TABLET | Refills: 0
Start: 2021-12-13 | End: 2022-04-14

## 2021-12-13 RX ADMIN — ALPRAZOLAM 0.5 MG: 0.5 TABLET ORAL at 02:08

## 2021-12-13 RX ADMIN — HYDROCHLOROTHIAZIDE 25 MG: 25 TABLET ORAL at 08:05

## 2021-12-13 RX ADMIN — PANTOPRAZOLE SODIUM 40 MG: 40 TABLET, DELAYED RELEASE ORAL at 08:05

## 2021-12-13 RX ADMIN — ACETAMINOPHEN 1000 MG: 500 TABLET ORAL at 11:08

## 2021-12-13 RX ADMIN — AMLODIPINE BESYLATE 10 MG: 10 TABLET ORAL at 08:05

## 2021-12-13 RX ADMIN — VALSARTAN 320 MG: 160 TABLET, FILM COATED ORAL at 08:05

## 2021-12-13 RX ADMIN — POTASSIUM CHLORIDE 10 MEQ: 10 CAPSULE, COATED, EXTENDED RELEASE ORAL at 08:05

## 2021-12-13 RX ADMIN — METOPROLOL TARTRATE 50 MG: 50 TABLET, FILM COATED ORAL at 08:05

## 2021-12-13 RX ADMIN — PROPRANOLOL HYDROCHLORIDE 10 MG: 10 TABLET ORAL at 08:05

## 2021-12-13 RX ADMIN — LEVOTHYROXINE SODIUM 25 MCG: 25 TABLET ORAL at 08:05

## 2021-12-13 RX ADMIN — ENOXAPARIN SODIUM 30 MG: 100 INJECTION SUBCUTANEOUS at 08:06

## 2021-12-13 RX ADMIN — SODIUM CHLORIDE TAB 1 GM 2 G: 1 TAB at 06:58

## 2021-12-13 ASSESSMENT — PAIN DESCRIPTION - DESCRIPTORS: DESCRIPTORS: SHARP

## 2021-12-13 ASSESSMENT — PAIN SCALES - WONG BAKER: WONGBAKER_NUMERICALRESPONSE: 8

## 2021-12-13 ASSESSMENT — PAIN SCALES - GENERAL: PAINLEVEL_OUTOF10: 4

## 2021-12-13 ASSESSMENT — PAIN DESCRIPTION - ORIENTATION: ORIENTATION: RIGHT

## 2021-12-13 ASSESSMENT — PAIN DESCRIPTION - PAIN TYPE: TYPE: SURGICAL PAIN

## 2021-12-13 ASSESSMENT — PAIN DESCRIPTION - LOCATION: LOCATION: HIP

## 2021-12-13 NOTE — PLAN OF CARE
Problem: Pain:  Goal: Pain level will decrease  Description: Pain level will decrease  12/13/2021 1309 by Yari Yañez RN  Outcome: Met This Shift  12/13/2021 0419 by Rich Levy RN  Outcome: Ongoing  Goal: Control of acute pain  Description: Control of acute pain  12/13/2021 1309 by Yari Yañez RN  Outcome: Met This Shift  12/13/2021 0419 by Rich Levy RN  Outcome: Ongoing  Goal: Control of chronic pain  Description: Control of chronic pain  12/13/2021 1309 by Yari Yañez RN  Outcome: Met This Shift  12/13/2021 0419 by Rich Levy RN  Outcome: Ongoing     Problem:  Activity:  Goal: Ability to tolerate increased activity will improve  Description: Ability to tolerate increased activity will improve  12/13/2021 1309 by Yari Yañez RN  Outcome: Met This Shift  12/13/2021 0419 by Rich Levy RN  Outcome: Ongoing     Problem: Falls - Risk of:  Goal: Will remain free from falls  Description: Will remain free from falls  12/13/2021 1309 by Yari Yañez RN  Outcome: Met This Shift  12/13/2021 0419 by Rich Levy RN  Outcome: Ongoing  Goal: Absence of physical injury  Description: Absence of physical injury  12/13/2021 1309 by Yari Yañez RN  Outcome: Met This Shift  12/13/2021 0419 by Rich Levy RN  Outcome: Ongoing     Problem: Musculor/Skeletal Functional Status  Goal: Highest potential functional level  12/13/2021 1309 by Yari Yañez RN  Outcome: Met This Shift  12/13/2021 0419 by Rich Levy RN  Outcome: Ongoing     Problem: Skin Integrity:  Goal: Will show no infection signs and symptoms  Description: Will show no infection signs and symptoms  12/13/2021 1309 by Yari Yañez RN  Outcome: Met This Shift  12/13/2021 0419 by Rich Levy RN  Outcome: Ongoing  Goal: Absence of new skin breakdown  Description: Absence of new skin breakdown  12/13/2021 1309 by Yari Yañez RN  Outcome: Met This Shift  12/13/2021 0419 by Jacquelin Dickson RN  Outcome: Ongoing

## 2021-12-13 NOTE — PLAN OF CARE
Problem: Pain:  Goal: Pain level will decrease  Description: Pain level will decrease  12/13/2021 0419 by Teofilo Eubanks RN  Outcome: Ongoing  12/12/2021 1845 by Neil Smith RN  Outcome: Ongoing  Goal: Control of acute pain  Description: Control of acute pain  12/13/2021 0419 by Teofilo Eubanks RN  Outcome: Ongoing  12/12/2021 1845 by Neil Smith RN  Outcome: Ongoing  Goal: Control of chronic pain  Description: Control of chronic pain  12/13/2021 0419 by Teofilo Eubanks RN  Outcome: Ongoing  12/12/2021 1845 by Neil Smith RN  Outcome: Ongoing     Problem:  Activity:  Goal: Ability to tolerate increased activity will improve  Description: Ability to tolerate increased activity will improve  12/13/2021 0419 by Teofilo Eubanks RN  Outcome: Ongoing  12/12/2021 1845 by Neil Smith RN  Outcome: Ongoing     Problem: Falls - Risk of:  Goal: Will remain free from falls  Description: Will remain free from falls  12/13/2021 0419 by Teofilo Eubanks RN  Outcome: Ongoing  12/12/2021 1845 by Neil Smith RN  Outcome: Met This Shift  Goal: Absence of physical injury  Description: Absence of physical injury  12/13/2021 0419 by Teofilo Eubanks RN  Outcome: Ongoing  12/12/2021 1845 by Neil Smith RN  Outcome: Met This Shift     Problem: Musculor/Skeletal Functional Status  Goal: Highest potential functional level  12/13/2021 0419 by Teofilo Eubanks RN  Outcome: Ongoing  12/12/2021 1845 by Neil Smith RN  Outcome: Met This Shift     Problem: Skin Integrity:  Goal: Will show no infection signs and symptoms  Description: Will show no infection signs and symptoms  12/13/2021 0419 by Teofilo Eubanks RN  Outcome: Ongoing  12/12/2021 1845 by Neil Smith RN  Outcome: Ongoing  Goal: Absence of new skin breakdown  Description: Absence of new skin breakdown  12/13/2021 0419 by Teofilo Eubanks RN  Outcome: Ongoing  12/12/2021 1845 by Neil Smith RN  Outcome: Ongoing

## 2021-12-13 NOTE — CARE COORDINATION
Discharge [address] [facility]  Clinical Case Management Department  Written by: Hannah Hernandez RN    Patient Name: [facility]  Attending Provider: Micheline Chu MD  Admit Date: 2021  3:46 PM  MRN: [id number]  Account: [de-identified]                     : 1929  Discharge Date: 21      Disposition: Sherry Hernandez RN

## 2021-12-13 NOTE — PROGRESS NOTES
PROGRESS NOTE          PATIENT NAME: Genet Beth  MEDICAL RECORD NO. [id number]  DATE: 2021  SURGEON: Dr. Octavio Colón: Jennifer Lane MD    HD: # 6    ASSESSMENT    Patient Active Problem List   Diagnosis    Chronic kidney disease, stage III (moderate) ([facility] [address]    Essential hypertension    Type 2 diabetes mellitus without complication ([facility] [address] [facility]    Microalbuminuria    Hypovitaminosis D    Closed subcapital fracture of femur, right, initial encounter [facility])       MEDICAL DECISION MAKING AND PLAN    1. Right femur fracture, s/p right hip hemiarthroplasty on , postop day 5.  1. Diabetic diet, tolerating well, fluid restriction to less than 1000 mL  2. No IV fluids, sodium today 132  3. Voiding spontaneously, 1 mL/kg/h  4. Pain control-Tylenol and Robaxin  5. DVT prophylaxis: Lovenox  6. IS  7. PT OT  8. Discharge planning      Chief Complaint: \" None\"    Zora Jackson was seen and examined at bedside. Patient resting comfortably in bed. No acute events overnight, she remained afebrile vital signs stable. Pain is managed appropriately. She is voiding spontaneously, tolerating a regular diet. Sodium today 132, no IV fluids. No new complaints.     OBJECTIVE  VITALS: Temp: Temp: 98 °F (36.7 °C)Temp  Av.4 °F (36.3 °C)  Min: 96.7 °F (35.9 °C)  Max: 98 °F (66.3 °C) BP Systolic (28CJO), YIT:812 , Min:135 , EBE:069   Diastolic (13OSA), ESW:66, Min:57, Max:60   Pulse Pulse  Av.5  Min: 76  Max: 77 Resp Resp  Av  Min: 16  Max: 16 Pulse ox SpO2  Av %  Min: 95 %  Max: 95 %  GENERAL: alert, no distress  NEURO: able move all 4 extremities, CN grossly intact  HEENT: NCAT  : deferred  LUNGS: clear to ausculation, without wheezes, rales or rhonci  HEART: normal rate and regular rhythm  ABDOMEN: soft, non-tender, non-distended, bowel sounds present in all 4 quadrants and no guarding or peritoneal signs present  EXTREMITY: no cyanosis, clubbing or edema    I/O last 3 completed shifts:  In: -   Out: 2000 [Urine:2000]    Drain/tube output: In: -   Out: 1700 [Urine:1700]    LAB:  CBC:   Recent Labs     12/11/21  0624   WBC 6.5   HGB 9.7*   HCT 28.1*   MCV 95.3        BMP:   Recent Labs     12/11/21  0624 12/11/21  1612 12/12/21  0426 12/12/21  1602 12/13/21  0528   *   < > 125* 129* 132*   K 3.6*  --  3.8  --  3.6*   CL 93*  --  94*  --  98   CO2 20  --  20  --  20   BUN 24*  --  22  --  19   CREATININE 0.80  --  0.74  --  0.75   GLUCOSE 147*  --  138*  --  139*    < > = values in this interval not displayed. COAGS: No results for input(s): APTT, PROT, INR in the last 72 hours.     RADIOLOGY:        Angie Ram MD  12/13/21, 7:37 AM

## 2021-12-13 NOTE — PROGRESS NOTES
Physical Therapy  Facility/Department: [address] ORTHO/MED SURG  Daily Treatment Note  NAME: Andressa Cortez  : 1929  MRN: [id number]    Date of Service: 2021    Discharge Recommendations:  Patient would benefit from continued therapy after discharge        Assessment   Body structures, Functions, Activity limitations: Decreased functional mobility ; Decreased strength; Decreased safe awareness; Decreased endurance; Decreased balance  Assessment: Pt required MOD A for bed mobility, assisted with BLEs, ambulated 20 ft x2 and 3 ftx2, requiringCGA/Jose Antonio utalized for safety, ModA for [facility] and chair transfers, increased time and effort; No LOB /buckling noted. Recomending continue therapy to address deficits  Prognosis: Good  PT Education: Goals; PT Role; Plan of Care; Transfer Training; Functional Mobility Training; Gait Training; General Safety; Home Exercise Program  Patient Education: Pt educated on HEP for strenthening  REQUIRES PT FOLLOW UP: Yes  Activity Tolerance  Activity Tolerance: Patient limited by fatigue; Patient limited by endurance; Patient limited by pain  Activity Tolerance: pt c/o of generalized stiffness, increased time and effort     Patient Diagnosis(es): The primary encounter diagnosis was Closed fracture of neck of right femur, initial encounter ([facility] [address] A diagnosis of Fall, initial encounter was also pertinent to this visit. has a past medical history of Anxiety, Cancer ([facility] [address] Chronic kidney disease, DM (diabetes mellitus) ([facility] [address] GI bleed, History of blood transfusion, Hypertension, Liver disease, and Osteoarthritis. has a past surgical history that includes Breast lumpectomy; lymph node dissection; Colonoscopy; Endoscopy, colon, diagnostic; hip surgery (Right, 2021); and hip surgery (Right, 2021).     Restrictions  Restrictions/Precautions  Restrictions/Precautions: Weight Bearing, Fall Risk, General Precautions  Required Braces or Orthoses?: No  Lower Extremity Weight Bearing Restrictions  Right Lower Extremity Weight Bearing: Weight Bearing As Tolerated  Position Activity Restriction  Other position/activity restrictions: s/p R hemiarthroplasty 12/8/21; CTLS clear 12/7/21  Subjective   General  Chart Reviewed: Yes  Response To Previous Treatment: Patient with no complaints from previous session. Family / Caregiver Present: No  Subjective  Subjective: RN and pt  agreeable to therapy. Pt EOB to [facility] on arrival, pt required increased encouragement t/o treatment. General Comment  Comments: Pt retired to bed, call light in reach  Pain Screening  Patient Currently in Pain: Yes  Pain Assessment  Pain Assessment: Faces  Culp-Baker Pain Rating: Hurts whole lot  Patient's Stated Pain Goal: No pain  Pain Type: Surgical pain  Pain Location: Hip, and stated generalized stiffness  Pain Orientation: Right  Pain Descriptors: Sharp       Orientation  Orientation  Overall Orientation Status: Within Functional Limits  Cognition   Cognition  Overall Cognitive Status: WFL  Objective   Bed mobility  Supine to Sit: Unable to assess, pt was EOB  Scooting: Moderate assistance  Comment: MaxA with BLE progression  Transfers  Sit to Stand:  Moderate Assistance  Stand to sit: Moderate Assistance  Bed to Chair:  (BSC transfer and chair transfers comleted w/ MOD a)  Comment: Consistent cues for hand placement, fair return demo  Ambulation  Ambulation?: Yes  More Ambulation?: Yes  Ambulation 1  Surface: level tile  Device: Rolling Walker  Assistance: Contact guard assistance; Minimal assistance  Quality of Gait: step to gait pattern leading with RLE, short/shuffling steps, very slow pace  Gait Deviations: Decreased step length; Decreased step height  Distance: 20 ft x2  Comments: Pt  ambulated 20 ft x2, CGA/Jose Antonio, SW, significant time to complete, short shuffling steps with slow bonny,  Ambulation 2  Device 2: Standard Walker  Assistance 2: Minimal assistance  Gait Deviations: Decreased step length; Decreased step height; Slow Lesvia; Shuffles  Distance: 3ft x2  Stairs/Curb  Stairs?: No     Balance  Posture: Fair  Sitting - Dynamic: Fair; +  Standing - Dynamic: Poor; +  Comments: standing balance assessed with RW  Exercises  Gluteal Sets: x10 supine  Ankle Pumps: x10 supine  Comments:       Seated LE exercise program: Long Arc Quads, hip abduction/adduction, heel/toe raises, and marches.  Reps: 10 performed in  recliner chair       AM-PAC Score  AM-PAC Inpatient Mobility Raw Score : 12 (12/13/21 0948)  AM-PAC Inpatient T-Scale Score : 35.33 (12/13/21 0948)  Mobility Inpatient CMS 0-100% Score: 68.66 (12/13/21 0948)  Mobility Inpatient CMS G-Code Modifier : CL (12/13/21 5076)          Goals  Short term goals  Time Frame for Short term goals: 14 visits  Short term goal 1: Pt to ambulate 100ft modified independently with RW WBAT RLE  Short term goal 2: Pt to sit <> stand transfer independently WBAT RLE  Short term goal 3: Pt to demonstrate independent bed mobility  Short term goal 4: Pt to demonstrate good standing balance to decrease risk of falls  Short term goal 5: Pt to tolerate 30 minutes of therapy for endurance    Plan    Plan  Times per week: 1-2x per day; 6-7x per week  Times per day:  (1-2x per day)  Current Treatment Recommendations: Strengthening, ROM, Balance Training, Functional Mobility Training, Transfer Training, Endurance Training, Gait Training, Stair training, Home Exercise Program, Safety Education & Training, Patient/Caregiver Education & Training  Safety Devices  Type of devices: Call light within reach, Chair alarm in place, Gait belt, Nurse notified, Left in bed, All fall risk precautions in place  Restraints  Initially in place: No     Therapy Time   Individual Concurrent Group Co-treatment   Time In 0849         Time Out 0928         Minutes 39         Timed Code Treatment Minutes: [address]

## 2021-12-13 NOTE — CARE COORDINATION
Transitional planning  Phone call to Ignacio Koch at [facility] to check precert status. Ignacio Koch stated that she does not have it yet and will call when it comes through. [phone] Call from Jerry at [facility] to let us know they have precert. Report [phone]  Fax [phone] for 2PM per One Rufina Farr. Updated Jerry at [facility], patients RN and patient.

## 2021-12-13 NOTE — DISCHARGE SUMMARY
DISCHARGE SUMMARY:    PATIENT NAME:  Chantal Galvan  YOB: 1929  MEDICAL RECORD NO. [id number]  DATE: 12/13/21  PRIMARY CARE PHYSICIAN: Uvaldo Willis MD  ADMIT DATE:  12/7/2021    DISCHARGE DATE:  12/13/2021  DISPOSITION:  [facility]  ADMITTING DIAGNOSIS:   Fall    DIAGNOSIS:   Patient Active Problem List   Diagnosis    Chronic kidney disease, stage III (moderate) ([facility] [address]    Essential hypertension    Type 2 diabetes mellitus without complication ([facility] [address]    Microalbuminuria    Hypovitaminosis D    Closed subcapital fracture of femur, right, initial encounter ([facility] [address]       CONSULTANTS:  Ortho    PROCEDURES:   12/8: OR-Right hip hemiarthroplasty    HOSPITAL COURSE:   Chantal Galvan is a 80 y.o. 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 On day of discharge Chantal Galvan  was tolerating a regular diet  had adequate analgeia on oral medications  had no signs of complication. She was deemed medically stable for discharged to [address] [place], [place] [address]fsia:        Discharge Vitals:  height is 5' 4\" (1.626 m) and weight is 181 lb (82.1 kg). Her temporal temperature is 99.2 °F (37.3 °C). Her blood pressure is 147/54 (abnormal) and her pulse is 82. Her respiration is 19 and oxygen saturation is 96%.    Exam on day of discharge:  GENERAL: alert, no distress  NEURO: able move all 4 extremities, CN grossly intact  HEENT: NCAT  : deferred  LUNGS: clear to ausculation, without wheezes, rales or rhonci  HEART: normal rate and regular rhythm  ABDOMEN: soft, non-tender, non-distended, bowel sounds present in all 4 quadrants and no guarding or peritoneal signs present  EXTREMITY: no cyanosis, clubbing or edema    LABS:     Recent Labs     12/11/21  0624 12/11/21  1612 12/12/21  0426 12/12/21  1602 12/13/21  0528   WBC 6.5  --   --   --   --    HGB 9.7*  --   --   --   --    HCT 28.1*  --   --   --   --      --   --   --   --    *   < > 125* 129* 132*   K 3.6*  --  3.8  --  3.6*   CL 93*  --  94*  --  98   CO2 20  --  20  --  20   BUN 24*  --  22  --  19   CREATININE 0.80  --  0.74  --  0.75    < > = values in this interval not displayed. DIAGNOSTIC TESTS:    XR HIP RIGHT (2-3 VIEWS)    Result Date: 12/7/2021  EXAMINATION: TWO XRAY VIEWS OF THE RIGHT HIP; 5  XRAY VIEWS OF THE RIGHT FEMUR; THREE XRAY VIEWS OF THE RIGHT KNEE 12/7/2021 11:10 am; 12/7/2021 11:40 am COMPARISON: None. HISTORY: ORDERING SYSTEM PROVIDED HISTORY: fall, hip pain TECHNOLOGIST PROVIDED HISTORY: fall, hip pain Reason for Exam: fall, hip pain; ORDERING SYSTEM PROVIDED HISTORY: fall TECHNOLOGIST PROVIDED HISTORY: fall Reason for Exam: fall, hip pain FINDINGS: Three views of the right hip demonstrate a minimally displaced, impaction type fracture involving the subcapital portion of the right femoral neck. Mild varus deformity is present. Intertrochanteric region is intact. Pubic rami are intact. Five views of the right femur again demonstrate the impacted right subcapital femoral neck fracture. Remainder of the femur is intact. Three views of the right knee demonstrate tricompartment degenerative changes, without evidence of an acute fracture. No joint effusion is present. 1. Impacted, minimally displaced right subcapital femoral neck fracture, with mild varus deformity 2.  Tricompartment degenerative change involving the knee joint, without evidence of an acute fracture     XR FEMUR RIGHT (MIN 2 VIEWS)    Result Date: 12/7/2021  EXAMINATION: TWO XRAY VIEWS OF THE RIGHT HIP; 5  XRAY VIEWS OF THE RIGHT FEMUR; THREE XRAY VIEWS OF THE RIGHT KNEE 12/7/2021 11:10 am; 12/7/2021 11:40 am COMPARISON: None. HISTORY: ORDERING SYSTEM PROVIDED HISTORY: fall, hip pain TECHNOLOGIST PROVIDED HISTORY: fall, hip pain Reason for Exam: fall, hip pain; ORDERING SYSTEM PROVIDED HISTORY: fall TECHNOLOGIST PROVIDED HISTORY: fall Reason for Exam: fall, hip pain FINDINGS: Three views of the right hip demonstrate a minimally displaced, impaction type fracture involving the subcapital portion of the right femoral neck. Mild varus deformity is present. Intertrochanteric region is intact. Pubic rami are intact. Five views of the right femur again demonstrate the impacted right subcapital femoral neck fracture. Remainder of the femur is intact. Three views of the right knee demonstrate tricompartment degenerative changes, without evidence of an acute fracture. No joint effusion is present. 1. Impacted, minimally displaced right subcapital femoral neck fracture, with mild varus deformity 2. Tricompartment degenerative change involving the knee joint, without evidence of an acute fracture     XR KNEE RIGHT (3 VIEWS)    Result Date: 12/7/2021  EXAMINATION: TWO XRAY VIEWS OF THE RIGHT HIP; 5  XRAY VIEWS OF THE RIGHT FEMUR; THREE XRAY VIEWS OF THE RIGHT KNEE 12/7/2021 11:10 am; 12/7/2021 11:40 am COMPARISON: None. HISTORY: ORDERING SYSTEM PROVIDED HISTORY: fall, hip pain TECHNOLOGIST PROVIDED HISTORY: fall, hip pain Reason for Exam: fall, hip pain; ORDERING SYSTEM PROVIDED HISTORY: fall TECHNOLOGIST PROVIDED HISTORY: fall Reason for Exam: fall, hip pain FINDINGS: Three views of the right hip demonstrate a minimally displaced, impaction type fracture involving the subcapital portion of the right femoral neck. Mild varus deformity is present. Intertrochanteric region is intact. Pubic rami are intact. Five views of the right femur again demonstrate the impacted right subcapital femoral neck fracture. Remainder of the femur is intact.  Three views of the right knee demonstrate tricompartment degenerative changes, without evidence of an acute fracture. No joint effusion is present. 1. Impacted, minimally displaced right subcapital femoral neck fracture, with mild varus deformity 2. Tricompartment degenerative change involving the knee joint, without evidence of an acute fracture     XR CHEST PORTABLE    Result Date: 12/7/2021  EXAMINATION: ONE XRAY VIEW OF THE CHEST 12/7/2021 9:21 pm COMPARISON: None. HISTORY: ORDERING SYSTEM PROVIDED HISTORY: Pre-op screening TECHNOLOGIST PROVIDED HISTORY: Pre-op screening Reason for Exam: upr,pre  op,hip fx,no chest complaints FINDINGS: The cardiomediastinal silhouette is mildly enlarged in size and contour. Mild perihilar markings can be seen with interstitial edema vascular congestion versus mild interstitial pneumonia. .  No pleural effusion or pneumothorax is present. Increased perihilar interstitial markings can be seen with vascular congestion versus interstitial edema versus interstitial pneumonia. FLUORO FOR SURGICAL PROCEDURES    Result Date: 12/8/2021  Radiology exam is complete. No Radiologist dictation. Please follow up with ordering provider. XR HIP 2-3 VW W PELVIS RIGHT    Result Date: 12/8/2021  EXAMINATION: ONE XRAY VIEW OF THE PELVIS AND TWO XRAY VIEWS RIGHT HIP 12/8/2021 4:20 pm COMPARISON: Three-view study of the right hip from 12/07/2021 HISTORY: ORDERING SYSTEM PROVIDED HISTORY: post op in PACU TECHNOLOGIST PROVIDED HISTORY: post op in PACU FINDINGS: Hardware now in place status post right hip replacement for a previous identified impacted subcapital femoral neck fracture. Acetabular and femoral components appear to be in satisfactory position. Adjacent soft tissue lucencies demonstrated. No other fracture identified. Intra changes left hip, left greater trochanter and pubic symphysis. Status post right hip replacement; hardware in satisfactory alignment and position.        DISCHARGE INSTRUCTIONS     Discharge Medications:        Medication List      START taking these medications    acetaminophen 500 MG tablet  Commonly known as: TYLENOL  Take 2 tablets by mouth every 8 hours for 7 days     polyethylene glycol 17 g packet  Commonly known as: GLYCOLAX  Take 17 g by mouth daily  Start taking on: December 14, 2021     sennosides-docusate sodium 8.6-50 MG tablet  Commonly known as: SENOKOT-S  Take 1 tablet by mouth 2 times daily        CONTINUE taking these medications    ALPRAZolam 0.5 MG tablet  Commonly known as: XANAX     amLODIPine 10 MG tablet  Commonly known as: NORVASC     Cholecalciferol 50 MCG (2000 UT) Tabs     ferrous sulfate 325 (65 Fe) MG EC tablet  Commonly known as: FE TABS 325     hydroCHLOROthiazide 25 MG tablet  Commonly known as: HYDRODIURIL     levothyroxine 25 MCG tablet  Commonly known as: SYNTHROID     metFORMIN 500 MG tablet  Commonly known as: GLUCOPHAGE     metoprolol tartrate 50 MG tablet  Commonly known as: LOPRESSOR     pantoprazole 40 MG tablet  Commonly known as: PROTONIX     potassium chloride 10 MEQ extended release tablet  Commonly known as: KLOR-CON M     propranolol 10 MG tablet  Commonly known as: INDERAL     valsartan 320 MG tablet  Commonly known as: DIOVAN        STOP taking these medications    acetaminophen-codeine 300-30 MG per tablet  Commonly known as: TYLENOL #3     loperamide 2 MG tablet  Commonly known as: IMODIUM A-D           Where to Get Your Medications      Information about where to get these medications is not yet available    Ask your nurse or doctor about these medications  · acetaminophen 500 MG tablet  · polyethylene glycol 17 g packet  · sennosides-docusate sodium 8.6-50 MG tablet       Diet: ADULT ORAL NUTRITION SUPPLEMENT; Breakfast, Lunch, Dinner; Diabetic Oral Supplement  ADULT DIET; Regular; 4 carb choices (60 gm/meal);  High Fiber diet as tolerated  Activity: As instructed WEIGHT BEARING STATUS: Weight bearing as tolerated  Wound Care: Daily and as

## 2022-01-10 DIAGNOSIS — S72.001A CLOSED FRACTURE OF NECK OF RIGHT FEMUR, INITIAL ENCOUNTER (HCC): Primary | ICD-10-CM

## 2022-01-11 ENCOUNTER — OFFICE VISIT (OUTPATIENT)
Dept: ORTHOPEDIC SURGERY | Age: 87
End: 2022-01-11

## 2022-01-11 DIAGNOSIS — S72.001D CLOSED FRACTURE OF NECK OF RIGHT FEMUR WITH ROUTINE HEALING, SUBSEQUENT ENCOUNTER: Primary | ICD-10-CM

## 2022-01-11 PROCEDURE — 99024 POSTOP FOLLOW-UP VISIT: CPT | Performed by: ORTHOPAEDIC SURGERY

## 2022-01-11 NOTE — PROGRESS NOTES
[address]  [address] [facility] [address]  Dept: [phone]  Dept Fax: [phone]        [facility] Follow Up      Subjective:   Date of Surgery: 12/8/2021    Yasmeen Ford is a 80y.o. year old female who presents to the clinic today for routine follow up 5 weeks status post right hip arthroplasty for right femoral neck fracture on 12/8/2021. Patient originally was discharged to a SNF for rehab and is now back home in her apartment, starting home therapy this week. Patient states she was previously ambulating with a walker prior to surgery and continues to ambulate with a walker or cane without any pain. States she is back to routinely taking care of herself but feels she has just slowed down. Reports taking an Advil if she does endorse pain with immediate relief of symptoms. Yari Echavarria present states patient was having incontinence after the injury which continues to improve. Pt reports she notices swelling in the right leg is greater than the left, takes a water pill at baseline. Denies any new falls or injuries. Denies any numbness or tingling. Denies any drainage or issues with incisions. Review of Systems  Gen: no fever, chills, malaise  CV: no chest pain or palpitations  Resp: no cough or shortness of breath  GI: no nausea, vomiting, diarrhea, or constipation  Neuro: no seizures, vertigo, or headache  Msk: no right hip pain   10 remaining systems reviewed and negative    Objective : There were no vitals filed for this visit. There is no height or weight on file to calculate BMI. General: No acute distress, resting comfortably in the clinic  Neuro: alert. oriented  Eyes: Extra-ocular muscles intact  Pulm: Respirations unlabored and regular. Skin: warm, well perfused  Psych:   Patient has good fund of knowledge and displays understanding of exam, diagnosis, and plan.   RLE:  Skin intact, incisions healing with two small scabs. No drainage, erythema or tenderness from incision. Bilateral lower extremity edema R >L. Compartments soft. 2+ DP pulse. TA/EHL/FHL/GS motor intact. Deep and Superficial Peroneal/Saphenous/Sural SILT. Radiology:  History: Right femoral neck fracture s/p hemiarthroplasty on 12/8/21. Comparison: 12/8/21    Findings: 2 views views of the right hip (AP, lateral) in a skeletally mature patient showing stable appearance of the right hemiarthroplasty hardware. There is no dislocation or periprosthetic fracture. Acetabular and femoral components remain in satisfactory alignment. Impression: Stable appearance of right hip arthroplasty. Assessment:   80y.o. year old female with a right femoral neck fracture s/p hemiarthroplasty; DOS: 12/8/2021. Plan:   Overall patient is doing very well today and radiographs demonstrate stable hardware positioning. At this time she will continue to have no restrictions and may continue with therapy at her discretion. Instructed to follow up PRN. Follow up:Return if symptoms worsen or fail to improve. No orders of the defined types were placed in this encounter. No orders of the defined types were placed in this encounter. Electronically signed by FARRUKH Joshi CNP on 1/11/2022 at 4:45 PM    This note is created with the assistance of a speech recognition program.  While intending to generate a document that actually reflects the content of the visit, the document can still have some errors including those of syntax and sound a like substitutions which may escape proof reading.   In such instances, actual meaning can be extrapolated by contextual diversion

## 2022-09-22 ENCOUNTER — ANESTHESIA EVENT (OUTPATIENT)
Dept: ENDOSCOPY | Age: 87
End: 2022-09-22
Payer: COMMERCIAL

## 2022-09-22 ENCOUNTER — ANESTHESIA (OUTPATIENT)
Dept: ENDOSCOPY | Age: 87
End: 2022-09-22
Payer: COMMERCIAL

## 2022-09-22 ENCOUNTER — HOSPITAL ENCOUNTER (OUTPATIENT)
Age: 87
Setting detail: OUTPATIENT SURGERY
Discharge: HOME | End: 2022-09-22
Attending: INTERNAL MEDICINE | Admitting: INTERNAL MEDICINE
Payer: COMMERCIAL

## 2022-09-22 VITALS
HEART RATE: 68 BPM | TEMPERATURE: 96.6 F | BODY MASS INDEX: 26.12 KG/M2 | SYSTOLIC BLOOD PRESSURE: 153 MMHG | OXYGEN SATURATION: 98 % | WEIGHT: 153 LBS | RESPIRATION RATE: 18 BRPM | DIASTOLIC BLOOD PRESSURE: 72 MMHG | HEIGHT: 64 IN

## 2022-09-22 PROCEDURE — 2500000003 HC RX 250 WO HCPCS: Performed by: STUDENT IN AN ORGANIZED HEALTH CARE EDUCATION/TRAINING PROGRAM

## 2022-09-22 PROCEDURE — 7100000011 HC PHASE II RECOVERY - ADDTL 15 MIN: Performed by: INTERNAL MEDICINE

## 2022-09-22 PROCEDURE — 3609012300 HC EGD BAND LIGATION ESOPHGEAL/GASTRIC VARICES: Performed by: INTERNAL MEDICINE

## 2022-09-22 PROCEDURE — 3700000001 HC ADD 15 MINUTES (ANESTHESIA): Performed by: INTERNAL MEDICINE

## 2022-09-22 PROCEDURE — 7100000010 HC PHASE II RECOVERY - FIRST 15 MIN: Performed by: INTERNAL MEDICINE

## 2022-09-22 PROCEDURE — 2580000003 HC RX 258: Performed by: INTERNAL MEDICINE

## 2022-09-22 PROCEDURE — 6360000002 HC RX W HCPCS: Performed by: STUDENT IN AN ORGANIZED HEALTH CARE EDUCATION/TRAINING PROGRAM

## 2022-09-22 PROCEDURE — 2720000010 HC SURG SUPPLY STERILE: Performed by: INTERNAL MEDICINE

## 2022-09-22 PROCEDURE — 3700000000 HC ANESTHESIA ATTENDED CARE: Performed by: INTERNAL MEDICINE

## 2022-09-22 RX ORDER — LIDOCAINE HYDROCHLORIDE 10 MG/ML
INJECTION, SOLUTION EPIDURAL; INFILTRATION; INTRACAUDAL; PERINEURAL PRN
Status: DISCONTINUED | OUTPATIENT
Start: 2022-09-22 | End: 2022-09-22 | Stop reason: SDUPTHER

## 2022-09-22 RX ORDER — VALSARTAN 320 MG/1
320 TABLET ORAL DAILY
COMMUNITY

## 2022-09-22 RX ORDER — HYDROCHLOROTHIAZIDE 25 MG/1
25 TABLET ORAL DAILY
COMMUNITY

## 2022-09-22 RX ORDER — SODIUM CHLORIDE 9 MG/ML
INJECTION, SOLUTION INTRAVENOUS CONTINUOUS
Status: DISCONTINUED | OUTPATIENT
Start: 2022-09-22 | End: 2022-09-22 | Stop reason: HOSPADM

## 2022-09-22 RX ORDER — MAGNESIUM OXIDE 400 MG/1
400 TABLET ORAL DAILY
COMMUNITY

## 2022-09-22 RX ORDER — PROPOFOL 10 MG/ML
INJECTION, EMULSION INTRAVENOUS PRN
Status: DISCONTINUED | OUTPATIENT
Start: 2022-09-22 | End: 2022-09-22 | Stop reason: SDUPTHER

## 2022-09-22 RX ADMIN — SODIUM CHLORIDE: 9 INJECTION, SOLUTION INTRAVENOUS at 11:26

## 2022-09-22 RX ADMIN — LIDOCAINE HYDROCHLORIDE 50 MG: 10 INJECTION, SOLUTION EPIDURAL; INFILTRATION; INTRACAUDAL; PERINEURAL at 11:29

## 2022-09-22 RX ADMIN — PROPOFOL 230 MG: 10 INJECTION, EMULSION INTRAVENOUS at 11:31

## 2022-09-22 ASSESSMENT — PAIN SCALES - GENERAL
PAINLEVEL_OUTOF10: 0
PAINLEVEL_OUTOF10: 3

## 2022-09-22 ASSESSMENT — PAIN - FUNCTIONAL ASSESSMENT: PAIN_FUNCTIONAL_ASSESSMENT: 0-10

## 2022-09-22 NOTE — OP NOTE
EGD NOTE      Patient:   Monica You    :    1929    Facility:   [address] [place] [address]   Referring/PCP: Micheal Fay MD    Procedure:   Esophagogastroduodenoscopy with variceal band ligation  Date:     2022   Endoscopist:  Zara Gonzales D.O. Preoperative Diagnosis:     Esophageal varices    Postoperative Diagnosis:    Esophageal varices  PHG    Anesthesia:  MAC    Complications: None    Description of Procedure:  Informed consent was obtained after explanation of the procedure including indications, description of the procedure,  benefits and possible risks and complications of the procedure, and alternatives. Questions were answered. The patient's history was reviewed and a directed physical examination was performed prior to the procedure. Patient was monitored throughout the procedure with pulse oximetry and periodic assessment of vital signs. Patient was sedated as noted above. With the patient in the left lateral decubitus position, the Olympus videoendoscope was placed in the patient's mouth and under direct visualization passed into the esophagus. Visualization of the esophagus, stomach, and duodenum was performed during both introduction and withdrawal of the endoscope and retroflexed view of the proximal stomach was obtained. The scope was passed to the 2nd portion of the duodenum. The patient tolerated the procedure well and was taken to the recovery area in good condition. EBL: none  Implants: 5 bands  Specimen: none    Findings[de-identified]   Esophagus: Large mid and distal esophageal varices. No red color signs. EVL x 5. Stomach: Moderate portal HTN gastropathy changes. Duodenum: normal    Recommendations:   -Repeat EGD in 4 weeks.  -Labs provided to patient for CBC, CMP, INR.  -Paracentesis ordered.  -Resume meds. -Soft diet for next 48 hrs.       Electronically signed by Dariel Davis MD, D.O. on 2022 at 11:15 AM       Electronically signed by Dariel Davis MD on 9/22/2022 at 11:14 AM

## 2022-09-22 NOTE — ANESTHESIA PRE PROCEDURE
Department of Anesthesiology  Preprocedure Note       Name:  Marguerita Phalen   Age:  80 y.o.  :  1929                                          MRN:  [id number]         Date:  2022      Surgeon: Alexis Blandon):  Theodora Mack MD    Procedure: Procedure(s):  EGD ESOPHAGOGASTRODUODENOSCOPY    Medications prior to admission:   Prior to Admission medications    Medication Sig Start Date End Date Taking? Authorizing Provider   acetaminophen (TYLENOL) 500 MG tablet Take 2 tablets by mouth every 8 hours for 7 days 21  FARRUKH Hein CNP   sennosides-docusate sodium (SENOKOT-S) 8.6-50 MG tablet Take 1 tablet by mouth 2 times daily 21   FARRUKH Hein CNP   Cholecalciferol 50 MCG ( UT) TABS Take 50 mcg by mouth 2 times daily    Historical Provider, MD   ferrous sulfate (FE TABS 325) 325 (65 Fe) MG EC tablet Take 325 mg by mouth 2 times daily 21   Historical Provider, MD   levothyroxine (SYNTHROID) 25 MCG tablet Take 25 mcg by mouth daily 21   Historical Provider, MD   pantoprazole (PROTONIX) 40 MG tablet Take 40 mg by mouth daily    Historical Provider, MD   potassium chloride (KLOR-CON M) 10 MEQ extended release tablet Take 10 mEq by mouth daily 21   Historical Provider, MD   propranolol (INDERAL) 10 MG tablet Take 10 mg by mouth 2 times daily 21   Historical Provider, MD   ALPRAZolam [place]) 0.5 MG tablet Take 0.5 mg by mouth nightly as needed for Sleep. Historical Provider, MD   amLODIPine (NORVASC) 10 MG tablet Take 10 mg by mouth daily    Historical Provider, MD   metFORMIN (GLUCOPHAGE) 500 MG tablet Take 500 mg by mouth 2 times daily (with meals)    Historical Provider, MD       Current medications:    No current facility-administered medications for this visit. No current outpatient medications on file.      Facility-Administered Medications Ordered in Other Visits   Medication Dose Route Frequency Provider Last Rate Last Admin    0.9 % 12/13/21 181 lb (82.1 kg)     There is no height or weight on file to calculate BMI.    CBC:   Lab Results   Component Value Date/Time    WBC 6.5 12/11/2021 06:24 AM    RBC 2.95 12/11/2021 06:24 AM    HGB 9.7 12/11/2021 06:24 AM    HCT 28.1 12/11/2021 06:24 AM    MCV 95.3 12/11/2021 06:24 AM    RDW 14.2 12/11/2021 06:24 AM     12/11/2021 06:24 AM       CMP:   Lab Results   Component Value Date/Time     12/13/2021 05:28 AM    K 3.6 12/13/2021 05:28 AM    CL 98 12/13/2021 05:28 AM    CO2 20 12/13/2021 05:28 AM    BUN 19 12/13/2021 05:28 AM    CREATININE 0.75 12/13/2021 05:28 AM    GFRAA >60 12/13/2021 05:28 AM    LABGLOM >60 12/13/2021 05:28 AM    GLUCOSE 139 12/13/2021 05:28 AM    PROT 8.2 12/07/2021 07:01 PM    CALCIUM 9.0 12/13/2021 05:28 AM    BILITOT 1.15 12/07/2021 07:01 PM    ALKPHOS 170 12/07/2021 07:01 PM    AST 65 12/07/2021 07:01 PM    ALT 24 12/07/2021 07:01 PM       POC Tests:   No results for input(s): POCGLU, POCNA, POCK, POCCL, POCBUN, POCHEMO, POCHCT in the last 72 hours.     Coags:   Lab Results   Component Value Date/Time    PROTIME 12.5 12/07/2021 11:48 PM    INR 1.2 12/07/2021 11:48 PM    APTT 25.8 12/07/2021 11:48 PM       HCG (If Applicable): No results found for: PREGTESTUR, PREGSERUM, HCG, HCGQUANT     ABGs: No results found for: PHART, PO2ART, NDZ1KWF, PHF0IDI, BEART, C0NTSPQD     Type & Screen (If Applicable):  No results found for: LABABO, LABRH    Drug/Infectious Status (If Applicable):  No results found for: HIV, HEPCAB    COVID-19 Screening (If Applicable):   Lab Results   Component Value Date/Time    COVID19 Not Detected 12/13/2021 11:19 AM           Anesthesia Evaluation  Patient summary reviewed and Nursing notes reviewed no history of anesthetic complications:   Airway: Mallampati: II  TM distance: >3 FB   Neck ROM: full  Mouth opening: > = 3 FB   Dental:    (+) poor dentition      Pulmonary:Negative Pulmonary ROS and normal exam Cardiovascular:  Exercise tolerance: no interval change,   (+) hypertension:,     (-) CAD and CABG/stent    ECG reviewed  Rhythm: regular  Rate: normal                    Neuro/Psych:   Negative Neuro/Psych ROS              GI/Hepatic/Renal:   (+) GERD:, PUD, liver disease:,           Endo/Other:    (+) DiabetesType II DM, , .                 Abdominal:             Vascular: Other Findings:             Anesthesia Plan      MAC     ASA 3       Induction: intravenous. Anesthetic plan and risks discussed with patient. Use of blood products discussed with patient whom consented to blood products. Plan discussed with CRNA.                     Trina Easley MD   9/22/2022

## 2022-09-22 NOTE — DISCHARGE INSTRUCTIONS
[facility]    POST-ENDOSCOPY INSTRUCTIONS    1. ACTIVITY   No driving, operating machinery, or making important decisions for 24 hours. Resume normal activity after 24 hours. You may return to work after 24 hours. 2. DIET        Resume your usual diet unless specified below. Soft diet for next 48 hrs. 3. MEDICATIONS    Resume your usual medications. Do not consume alcohol, tranquilizers, or sleeping medications for 24 hour unless advised by your physician. 4. PHYSICIAN FOLLOW-UP / INSTRUCTIONS    Please call the office/clinic to schedule repeat EGD in 4 weeks. GI office:  [phone]. Call office for f/u appt. Follow up with your family physician as planned. 6. NORMAL CHANGES YOU MAY EXPERIENCE AFTER ENDOSCOPY:         EGD/ERCP     COLONOSCOPY     Sore throat after EGD/ERCP  Passing of gas for several hours. A bloated feeling and belching from  Some mild abdominal cramping. air in stomach               You may feel fatigued for the next 24-48    hours due to the prep and sedation    7. CALL YOUR PHYSICIAN IF YOU EXPERIENCE ANY OF THE FOLLOWING      A. Passing blood rectally or vomiting blood (color may be red or black)      B. Severe abdominal pain or tenderness (that is not relieved by passing air)      C.   Fever, chills, or excessive sweating      D.  Persistent nausea or vomiting      E.  Redness or swelling at the IV site    If you have additional questions, PLEASE call your doctor or the [facility] ([phone])

## 2022-09-22 NOTE — H&P
History and Physical    Pt Name: Guzman Sanchez  MRN: [id number]  YOB: 1929  Date of evaluation: 9/22/2022  Primary Care Physician: Tasia Johnson MD    SUBJECTIVE:   History of Chief Complaint:    Guzman Sanchez is a 80 y.o. female who is scheduled today for EGD. Patient reports she has been feeling weakness, fatigue, and abdominal distention for the last two months. Patient states she has been found to have anemia and required blood transfusion. She denies any nausea, vomiting, dysphagia or abdominal pain. Allergies  is allergic to latex, atorvastatin, keflex [cephalexin], penicillins, and rosuvastatin. Medications  Prior to Admission medications    Medication Sig Start Date End Date Taking? Authorizing Provider   magnesium oxide (MAG-OX) 400 MG tablet Take 400 mg by mouth daily   Yes Historical Provider, MD   valsartan (DIOVAN) 320 MG tablet Take 320 mg by mouth daily   Yes Historical Provider, MD   hydroCHLOROthiazide (HYDRODIURIL) 25 MG tablet Take 25 mg by mouth daily   Yes Historical Provider, MD   triamcinolone (KENALOG) 0.1 % ointment Apply topically 3 times daily Apply topically 2 times daily.    Yes Historical Provider, MD   acetaminophen (TYLENOL) 500 MG tablet Take 2 tablets by mouth every 8 hours for 7 days 12/13/21 4/14/22  FARRUKH Mccarty CNP   sennosides-docusate sodium (SENOKOT-S) 8.6-50 MG tablet Take 1 tablet by mouth 2 times daily 12/13/21   FARRUKH Mccarty CNP   Cholecalciferol 50 MCG (2000 UT) TABS Take 50 mcg by mouth 2 times daily    Historical Provider, MD   ferrous sulfate (FE TABS 325) 325 (65 Fe) MG EC tablet Take 325 mg by mouth 2 times daily 5/27/21   Historical Provider, MD   levothyroxine (SYNTHROID) 25 MCG tablet Take 25 mcg by mouth daily 7/1/21   Historical Provider, MD   pantoprazole (PROTONIX) 40 MG tablet Take 40 mg by mouth daily    Historical Provider, MD   potassium chloride (KLOR-CON M) 10 MEQ extended release tablet Take 10 mEq by mouth daily 9/9/21   Historical Provider, MD   propranolol (INDERAL) 10 MG tablet Take 10 mg by mouth 2 times daily 5/27/21   Historical Provider, MD   ALPRAZolam Ros[place]en Kid[place]) 0.5 MG tablet Take 0.5 mg by mouth nightly as needed for Sleep. Historical Provider, MD   amLODIPine (NORVASC) 10 MG tablet Take 10 mg by mouth daily    Historical Provider, MD   metFORMIN (GLUCOPHAGE) 500 MG tablet Take 500 mg by mouth 2 times daily (with meals)    Historical Provider, MD     Past Medical History    has a past medical history of Anemia, Anxiety, Cancer ([facility] [address] Chronic kidney disease, DM (diabetes mellitus) ([facility] [address] Esophageal varices ([facility] [address] GI bleed, History of blood transfusion, Hypertension, Liver disease, and Osteoarthritis. Past Surgical History   has a past surgical history that includes Breast lumpectomy; lymph node dissection; Colonoscopy; Endoscopy, colon, diagnostic; hip surgery (Right, 12/08/2021); and hip surgery (Right, 12/8/2021). Social History   reports that she has never smoked. She has never used smokeless tobacco.   reports no history of alcohol use. reports no history of drug use. Family History  No family status information on file. Family history is unknown by patient. Review of Systems:  CONSTITUTIONAL:   negative for fevers, chills, fatigue and malaise    EYES:   negative for double vision, blurred vision and photophobia    HEENT:   negative for tinnitus, epistaxis and sore throat     RESPIRATORY:   negative for cough, shortness of breath, wheezing     CARDIOVASCULAR:   negative for chest pain, palpitations, syncope, edema     GASTROINTESTINAL:   See HPI    GENITOURINARY:   negative for incontinence     MUSCULOSKELETAL:   negative for neck or back pain     NEUROLOGICAL:   Negative for weakness and tingling  negative for headaches and dizziness     PSYCHIATRIC:   negative for anxiety       OBJECTIVE:   VITALS:  height is 5' 4\" (1.626 m) and weight is 153 lb (69.4 kg).  Her infrared temperature is 96.6 °F (35.9 °C) (abnormal). Her blood pressure is 140/70 (abnormal) and her pulse is 62. Her respiration is 12 and oxygen saturation is 96%. CONSTITUTIONAL:alert & oriented x 3, no acute distress. Calm and pleasant. SKIN:  Warm and dry, no rashes to exposed areas of skin. HEAD:  Normocephalic, atraumatic. EYES: PERRL. EOMs intact. Wearing glasses. EARS:  Intact and equal bilaterally. No edema or thickening, without lumps, lesions, or discharge. Hearing loss bilaterally; no aides in today. NOSE:  Nares patent. No rhinorrhea. MOUTH/THROAT:  Mucous membranes pink and moist, uvula midline, teeth appear to be intact. NECK: Supple, no lymphadenopathy. LUNGS: Respirations even and non-labored. Clear to auscultation bilaterally, no wheezes, rales, or rhonchi. CARDIOVASCULAR: Regular rate and rhythm, soft murmur. ABDOMEN: soft, non-tender, distended but soft, bowel sounds active x 4. EXTREMITIES: 1+ edema to bilateral lower extremities. No varicosities to bilateral lower extremities. NEUROLOGIC: CN II-XII are grossly intact. Gait not assessed. IMPRESSIONS:   Esophageal varices. PLANS:   EGD.     FARRUKH Baker CNP   Electronically signed 9/22/2022 at 10:16 AM

## 2022-11-03 ENCOUNTER — HOSPITAL ENCOUNTER (OUTPATIENT)
Age: 87
Setting detail: OUTPATIENT SURGERY
Discharge: HOME | End: 2022-11-03
Attending: INTERNAL MEDICINE | Admitting: INTERNAL MEDICINE
Payer: COMMERCIAL

## 2022-11-03 ENCOUNTER — ANESTHESIA EVENT (OUTPATIENT)
Dept: ENDOSCOPY | Age: 87
End: 2022-11-03
Payer: COMMERCIAL

## 2022-11-03 ENCOUNTER — ANESTHESIA (OUTPATIENT)
Dept: ENDOSCOPY | Age: 87
End: 2022-11-03
Payer: COMMERCIAL

## 2022-11-03 VITALS
BODY MASS INDEX: 23.9 KG/M2 | RESPIRATION RATE: 17 BRPM | TEMPERATURE: 96.3 F | HEART RATE: 72 BPM | WEIGHT: 140 LBS | DIASTOLIC BLOOD PRESSURE: 70 MMHG | HEIGHT: 64 IN | OXYGEN SATURATION: 99 % | SYSTOLIC BLOOD PRESSURE: 137 MMHG

## 2022-11-03 PROCEDURE — 2580000003 HC RX 258: Performed by: INTERNAL MEDICINE

## 2022-11-03 PROCEDURE — 6360000002 HC RX W HCPCS: Performed by: NURSE ANESTHETIST, CERTIFIED REGISTERED

## 2022-11-03 PROCEDURE — 3609017100 HC EGD: Performed by: INTERNAL MEDICINE

## 2022-11-03 PROCEDURE — 3700000000 HC ANESTHESIA ATTENDED CARE: Performed by: INTERNAL MEDICINE

## 2022-11-03 PROCEDURE — 2500000003 HC RX 250 WO HCPCS: Performed by: NURSE ANESTHETIST, CERTIFIED REGISTERED

## 2022-11-03 PROCEDURE — 7100000010 HC PHASE II RECOVERY - FIRST 15 MIN: Performed by: INTERNAL MEDICINE

## 2022-11-03 PROCEDURE — 7100000011 HC PHASE II RECOVERY - ADDTL 15 MIN: Performed by: INTERNAL MEDICINE

## 2022-11-03 RX ORDER — LIDOCAINE HYDROCHLORIDE 10 MG/ML
INJECTION, SOLUTION INFILTRATION; PERINEURAL PRN
Status: DISCONTINUED | OUTPATIENT
Start: 2022-11-03 | End: 2022-11-03 | Stop reason: SDUPTHER

## 2022-11-03 RX ORDER — SODIUM CHLORIDE 9 MG/ML
INJECTION, SOLUTION INTRAVENOUS CONTINUOUS
Status: DISCONTINUED | OUTPATIENT
Start: 2022-11-03 | End: 2022-11-03 | Stop reason: HOSPADM

## 2022-11-03 RX ORDER — PROPOFOL 10 MG/ML
INJECTION, EMULSION INTRAVENOUS PRN
Status: DISCONTINUED | OUTPATIENT
Start: 2022-11-03 | End: 2022-11-03 | Stop reason: SDUPTHER

## 2022-11-03 RX ADMIN — SODIUM CHLORIDE: 9 INJECTION, SOLUTION INTRAVENOUS at 09:59

## 2022-11-03 RX ADMIN — PROPOFOL 40 MG: 10 INJECTION, EMULSION INTRAVENOUS at 10:23

## 2022-11-03 RX ADMIN — PROPOFOL 20 MG: 10 INJECTION, EMULSION INTRAVENOUS at 10:25

## 2022-11-03 RX ADMIN — LIDOCAINE HYDROCHLORIDE 40 MG: 10 INJECTION, SOLUTION INFILTRATION; PERINEURAL at 10:23

## 2022-11-03 ASSESSMENT — PAIN - FUNCTIONAL ASSESSMENT: PAIN_FUNCTIONAL_ASSESSMENT: NONE - DENIES PAIN

## 2022-11-03 NOTE — OP NOTE
Patient: Mo Villalobos  YOB: 1929  MRN: [id number]    Date of Procedure: 11/3/2022    Pre-Op Diagnosis: VARICES    Post-Op Diagnosis:  Grade I-II esophageal varices. Mild PHG. Mild GAVE       Procedure(s):  EGD ESOPHAGOGASTRODUODENOSCOPY    Surgeon(s):  Concepción Rooney DO    Assistant:   First Assistant: Estelle Banda RN    Anesthesia: Monitor Anesthesia Care    Estimated Blood Loss (mL): none    Complications: None    Specimens:   * No specimens in log *    Implants:  * No implants in log *      Drains: * No LDAs found *    Findings: See below    Detailed Description of Procedure: nformed consent was obtained from the patient after explanation of the procedure including indications, description of the procedure,  benefits and possible risks and complications of the procedure, and alternatives. Questions were answered. The patient's history was reviewed and a directed physical examination was performed prior to the procedure. Patient was monitored throughout the procedure with pulse oximetry and periodic assessment of vital signs. Patient was sedated as noted above. With the patient in the left lateral decubitus position, the Olympus videoendoscope was placed in the patient's mouth and under direct visualization passed into the esophagus. Visualization of the esophagus, stomach, and duodenum was performed during both introduction and withdrawal of the endoscope and retroflexed view of the proximal stomach was obtained. The scope was passed to the 2nd portion of the duodenum. The patient tolerated the procedure well and was taken to the recovery area in good condition. Findings[de-identified]   Esophagus: abnormal: GEJ/Hiatus 35 cm. Grade I-II varices with scarring from prior ligation. No high risk stigmata. Stomach: abnormal: Mild PHG. Mild non bleeding GAVE  Duodenum: normal    Recommendations: -Follow up with GI office as previously arranged.     Electronically signed by Concepción Rooney DO on 11/3/2022 at 10:30 AM

## 2022-11-03 NOTE — DISCHARGE INSTRUCTIONS
[facility]    POST-ENDOSCOPY INSTRUCTIONS:    1. ACTIVITY   No driving, operating machinery, or making important decisions for 24 hours. Resume normal activity after 24 hours. You may return to work after 24 hours. 2. DIET     __x__ (EGD/ERCP):  Do not eat or drink for one hour after your exam.  You may then   try a sip of water, and if you are able to swallow as usual you may advance to a   regular diet. ____ (Colonscopy/Flex Sig):  Resume your usual diet unless specified below. ____ Diet Modification:    3. MEDICATIONS (Do not consume alcohol, tranquilizers, or sleeping medications for 24           hours unless advised by your physician)       ____x_ Resume your usual medications    4. PHYSICIAN FOLLOW-UP        __x__ Please call the office for an appointment/further instructions. ____ See your family physician. 5. ADDITIONAL INSTRUCTIONS          6. NORMAL CHANGES YOU MAY EXPERIENCE AFTER ENDOSCOPY:          EGD/ERCP     COLONOSCOPY      Sore throat after EGD/ERCP   Passing of gas for several hours after      A bloated feeling and belching from  Some mild abdominal cramping   air in stomach    If a biopsy/polypectomy was done, you      If a biopsy was done, you may spit   may see some spotting of blood   up some blood tinged mucous  You may feel fatigued for the next 24-48         hours due to the prep and sedation    7. CALL YOUR PHYSICIAN IF YOU EXPERIENCE ANY OF THE FOLLOWING:      A.  Passing blood rectally or vomiting blood (color may be red or black)      B. Severe abdominal pain or tenderness (that is not relieved by passing air)      C.   Fever, chills, or excessive sweating      D.  Persistent nausea or vomiting      E.  Redness or swelling at the IV site    If you have additional questions, PLEASE call your doctor @ _______________________ or the [facility] [phone]

## 2022-11-03 NOTE — ANESTHESIA PRE PROCEDURE
Department of Anesthesiology  Preprocedure Note       Name:  Bryson Bruno   Age:  80 y.o.  :  1929                                          MRN:  [id number]         Date:  11/3/2022      Surgeon: Gabriella Gr):  Jeff Roblero DO    Procedure: Procedure(s):  EGD ESOPHAGOGASTRODUODENOSCOPY    Medications prior to admission:   Prior to Admission medications    Medication Sig Start Date End Date Taking? Authorizing Provider   magnesium oxide (MAG-OX) 400 MG tablet Take 400 mg by mouth daily    Historical Provider, MD   valsartan (DIOVAN) 320 MG tablet Take 320 mg by mouth daily    Historical Provider, MD   hydroCHLOROthiazide (HYDRODIURIL) 25 MG tablet Take 25 mg by mouth daily    Historical Provider, MD   triamcinolone (KENALOG) 0.1 % ointment Apply topically 3 times daily Apply topically 2 times daily. Historical Provider, MD   acetaminophen (TYLENOL) 500 MG tablet Take 2 tablets by mouth every 8 hours for 7 days 21  Tiffany Arredondo APRN - CNP   sennosides-docusate sodium (SENOKOT-S) 8.6-50 MG tablet Take 1 tablet by mouth 2 times daily 21   Tiffany Arredondo APRQIAN - CNP   Cholecalciferol 50 MCG (2000 UT) TABS Take 50 mcg by mouth 2 times daily    Historical Provider, MD   ferrous sulfate (FE TABS 325) 325 (65 Fe) MG EC tablet Take 325 mg by mouth 2 times daily 21   Historical Provider, MD   levothyroxine (SYNTHROID) 25 MCG tablet Take 25 mcg by mouth daily 21   Historical Provider, MD   pantoprazole (PROTONIX) 40 MG tablet Take 40 mg by mouth daily    Historical Provider, MD   potassium chloride (KLOR-CON M) 10 MEQ extended release tablet Take 10 mEq by mouth daily 21   Historical Provider, MD   propranolol (INDERAL) 10 MG tablet Take 10 mg by mouth 2 times daily 21   Historical Provider, MD   ALPRAZolam [place]) 0.5 MG tablet Take 0.5 mg by mouth nightly as needed for Sleep.      Historical Provider, MD   amLODIPine (NORVASC) 10 MG tablet Take 10 mg by mouth daily Historical Provider, MD   metFORMIN (GLUCOPHAGE) 500 MG tablet Take 500 mg by mouth 2 times daily (with meals)    Historical Provider, MD       Current medications:    No current outpatient medications on file. No current facility-administered medications for this visit. Allergies: Allergies   Allergen Reactions    Latex     Atorvastatin     Keflex [Cephalexin]     Penicillins     Rosuvastatin        Problem List:    Patient Active Problem List   Diagnosis Code    Chronic kidney disease, stage III (moderate) ([facility]) N18.30    Essential hypertension I10    Type 2 diabetes mellitus without complication ([facility]) A33.7    Microalbuminuria R80.9    Hypovitaminosis D E55.9    Closed subcapital fracture of femur, right, initial encounter ([facility] [address] S72.011A    Closed fracture of neck of right femur ([facility] [address] S72.001A       Past Medical History:        Diagnosis Date    Anemia     Anxiety     Cancer ([facility] [address]     Chronic kidney disease     DM (diabetes mellitus) ([facility] [address]     Esophageal varices (HCC)     GI bleed     History of blood transfusion     no reaction per patient    Hypertension     Liver disease     Murmur     Osteoarthritis        Past Surgical History:        Procedure Laterality Date    BREAST LUMPECTOMY      right    COLONOSCOPY      ENDOSCOPY, COLON, DIAGNOSTIC      x3    HIP SURGERY Right 12/08/2021    HIP HEMIARTHROPLASTY    HIP SURGERY Right 12/8/2021    HIP HEMIARTHROPLASTY,  MEJIA AND NEPHEW performed by Michelle Monroy DO at [address] [facility]      nose    UPPER GASTROINTESTINAL ENDOSCOPY N/A 9/22/2022    EGD BAND LIGATION performed by Nahed Isaacs MD at [facility]       Social History:    Social History     Tobacco Use    Smoking status: Never    Smokeless tobacco: Never   Substance Use Topics    Alcohol use: No     Alcohol/week: 0.0 standard drinks                                Counseling given: Not Answered      Vital Signs (Current):    There were no vitals filed for this visit. BP Readings from Last 3 Encounters:   09/22/22 (!) 153/72   04/14/22 130/72   12/13/21 (!) 147/54       NPO Status:                                                                                 BMI:   Wt Readings from Last 3 Encounters:   09/22/22 153 lb (69.4 kg)   04/14/22 158 lb 12.8 oz (72 kg)   12/13/21 181 lb (82.1 kg)     There is no height or weight on file to calculate BMI.    CBC:   Lab Results   Component Value Date/Time    WBC 6.5 12/11/2021 06:24 AM    RBC 2.95 12/11/2021 06:24 AM    HGB 9.7 12/11/2021 06:24 AM    HCT 28.1 12/11/2021 06:24 AM    MCV 95.3 12/11/2021 06:24 AM    RDW 14.2 12/11/2021 06:24 AM     12/11/2021 06:24 AM       CMP:   Lab Results   Component Value Date/Time     12/13/2021 05:28 AM    K 3.6 12/13/2021 05:28 AM    CL 98 12/13/2021 05:28 AM    CO2 20 12/13/2021 05:28 AM    BUN 19 12/13/2021 05:28 AM    CREATININE 0.75 12/13/2021 05:28 AM    GFRAA >60 12/13/2021 05:28 AM    LABGLOM >60 12/13/2021 05:28 AM    GLUCOSE 139 12/13/2021 05:28 AM    PROT 8.2 12/07/2021 07:01 PM    CALCIUM 9.0 12/13/2021 05:28 AM    BILITOT 1.15 12/07/2021 07:01 PM    ALKPHOS 170 12/07/2021 07:01 PM    AST 65 12/07/2021 07:01 PM    ALT 24 12/07/2021 07:01 PM       POC Tests:   No results for input(s): POCGLU, POCNA, POCK, POCCL, POCBUN, POCHEMO, POCHCT in the last 72 hours.     Coags:   Lab Results   Component Value Date/Time    PROTIME 12.5 12/07/2021 11:48 PM    INR 1.2 12/07/2021 11:48 PM    APTT 25.8 12/07/2021 11:48 PM       HCG (If Applicable): No results found for: PREGTESTUR, PREGSERUM, HCG, HCGQUANT     ABGs: No results found for: PHART, PO2ART, KHC1EMX, GIR9BWO, BEART, A4FEMABG     Type & Screen (If Applicable):  No results found for: LABABO, LABRH    Drug/Infectious Status (If Applicable):  No results found for: HIV, HEPCAB    COVID-19 Screening (If Applicable):   Lab Results   Component Value Date/Time    COVID19 Not Detected 12/13/2021 11:19 AM           Anesthesia Evaluation  Patient summary reviewed and Nursing notes reviewed no history of anesthetic complications:   Airway: Mallampati: II  TM distance: >3 FB   Neck ROM: full  Mouth opening: > = 3 FB   Dental:    (+) poor dentition      Pulmonary:Negative Pulmonary ROS and normal exam                               Cardiovascular:  Exercise tolerance: no interval change,   (+) hypertension:,     (-) CAD and CABG/stent    ECG reviewed  Rhythm: regular  Rate: normal                    Neuro/Psych:   Negative Neuro/Psych ROS              GI/Hepatic/Renal:   (+) GERD:, PUD, liver disease: esophageal varices,           Endo/Other:    (+) DiabetesType II DM, , .                 Abdominal:             Vascular: Other Findings:             Anesthesia Plan      MAC     ASA 3       Induction: intravenous. Anesthetic plan and risks discussed with patient. Plan discussed with CRNA and attending.                     FARRUKH Umana - DION   11/3/2022

## 2022-11-03 NOTE — H&P
History and Physical    Pt Name: Carson Madden  MRN: [id number]  YOB: 1929  Date of evaluation: 11/3/2022  Primary Care Physician: Yareli English MD    SUBJECTIVE:   History of Chief Complaint:    Carson Madden is a 80 y.o. female who is scheduled today for EGD. She reports history of EGD band ligation on 9/22/22 with Dr. Thierry Rebollar. Hx esophageal varices, reports recent paracentesis. She reports feeling fine today, denies abdominal pain. Allergies  is allergic to latex, atorvastatin, keflex [cephalexin], penicillins, and rosuvastatin. Medications  Prior to Admission medications    Medication Sig Start Date End Date Taking? Authorizing Provider   magnesium oxide (MAG-OX) 400 MG tablet Take 400 mg by mouth daily    Historical Provider, MD   valsartan (DIOVAN) 320 MG tablet Take 320 mg by mouth daily    Historical Provider, MD   hydroCHLOROthiazide (HYDRODIURIL) 25 MG tablet Take 25 mg by mouth daily    Historical Provider, MD   triamcinolone (KENALOG) 0.1 % ointment Apply topically 3 times daily Apply topically 2 times daily.     Historical Provider, MD   acetaminophen (TYLENOL) 500 MG tablet Take 2 tablets by mouth every 8 hours for 7 days 12/13/21 4/14/22  FARRUKH Eisenberg CNP   sennosides-docusate sodium (SENOKOT-S) 8.6-50 MG tablet Take 1 tablet by mouth 2 times daily 12/13/21   FARRUKH Eisenberg CNP   Cholecalciferol 50 MCG (2000 UT) TABS Take 50 mcg by mouth 2 times daily    Historical Provider, MD   ferrous sulfate (FE TABS 325) 325 (65 Fe) MG EC tablet Take 325 mg by mouth 2 times daily 5/27/21   Historical Provider, MD   levothyroxine (SYNTHROID) 25 MCG tablet Take 25 mcg by mouth daily 7/1/21   Historical Provider, MD   pantoprazole (PROTONIX) 40 MG tablet Take 40 mg by mouth daily    Historical Provider, MD   potassium chloride (KLOR-CON M) 10 MEQ extended release tablet Take 10 mEq by mouth daily 9/9/21   Historical Provider, MD   propranolol (INDERAL) 10 MG tablet Take 10 mg by mouth 2 times daily 5/27/21   Historical Provider, MD   ALPRAZolam Celia Barrera) 0.5 MG tablet Take 0.5 mg by mouth nightly as needed for Sleep. Historical Provider, MD   amLODIPine (NORVASC) 10 MG tablet Take 10 mg by mouth daily    Historical Provider, MD   metFORMIN (GLUCOPHAGE) 500 MG tablet Take 500 mg by mouth 2 times daily (with meals)    Historical Provider, MD     Past Medical History    has a past medical history of Anemia, Anxiety, Cancer ([facility] [address] Chronic kidney disease, DM (diabetes mellitus) ([facility] [address] [facility] GI bleed, History of abdominal paracentesis, History of blood transfusion, Hypertension, Liver disease, Murmur, Osteoarthritis, and Wears glasses. Past Surgical History   has a past surgical history that includes Breast lumpectomy; lymph node dissection; Colonoscopy; Endoscopy, colon, diagnostic; hip surgery (Right, 12/08/2021); hip surgery (Right, 12/08/2021); Upper gastrointestinal endoscopy (N/A, 09/22/2022); and Paracentesis. Social History   reports that she has never smoked. She has never used smokeless tobacco.   reports no history of alcohol use. reports no history of drug use. Marital Status   Children yes  Lives in assisted living  Family History  Family history is unknown by patient.     Review of Systems:  CONSTITUTIONAL:   negative for fevers, chills, fatigue and malaise    EYES:   negative for double vision, blurred vision and photophobia    HEENT:   negative for tinnitus, epistaxis and sore throat     RESPIRATORY:   negative for cough, shortness of breath, wheezing     CARDIOVASCULAR:   negative for chest pain, palpitations, syncope, edema     GASTROINTESTINAL:   +see HPI   GENITOURINARY:   negative for incontinence     MUSCULOSKELETAL:   negative for neck or back pain     NEUROLOGICAL:   Negative for weakness and tingling  negative for headaches and dizziness     PSYCHIATRIC:   negative for anxiety       OBJECTIVE:   VITALS:  height is 5' 4\" (1.626 m) and weight is 140 lb (63.5 kg). Her blood pressure is 138/95 (abnormal) and her pulse is 76. Her respiration is 15 and oxygen saturation is 100%. CONSTITUTIONAL:alert & oriented x 3, no acute distress. Calm and pleasant. Very friendly. Appears younger than stated age. SKIN:  Warm and dry, no rashes on exposed areas of skin, seborrheic skin changes. HEAD:  Normocephalic, atraumatic   EYES: PERRL. EOMs intact. Wearing glasses. EARS:  Equal bilaterally, no edema or thickening, skin is intact without lumps or lesions. No discharge. Hearing loss mild. NOSE:  Nares patent. No rhinorrhea   MOUTH/THROAT:  Mucous membranes moist, tongue is pink, uvula midline, teeth appear to be intact  NECK:Full ROM  LUNGS: Respirations even and non-labored. Clear to auscultation bilaterally, no wheezes, rales, or rhonchi. CARDIOVASCULAR: Regular rate and rhythm, soft murmur appreciated. ABDOMEN: ascites   EXTREMITIES: No edema bilateral lower extremities. No varicosities bilateral lower extremities. NEUROLOGIC: CN II-XII are grossly intact. Gait not assessed.   IMPRESSIONS:   VARICES  PLANS:   EGFARRUKH KENNEY CNP   Electronically signed 11/3/2022 at 10:20 AM

## 2022-11-03 NOTE — ANESTHESIA POSTPROCEDURE EVALUATION
Department of Anesthesiology  Postprocedure Note    Patient: Jimy Mc  MRN: [id number]  YOB: 1929  Date of evaluation: 11/3/2022      Procedure Summary     Date: 11/03/22 Room / Location: [address] [place] [address]    Anesthesia Start: 1021 Anesthesia Stop: 1034    Procedure: EGD ESOPHAGOGASTRODUODENOSCOPY Diagnosis:       Esophageal varices without bleeding, unspecified esophageal varices type ([address]      (VARICES)    Surgeons: Myrna Avery DO Responsible Provider: Eugenie Hammer MD    Anesthesia Type: MAC ASA Status: 3          Anesthesia Type: No value filed.     Geronimo Phase I: Geronimo Score: 10    Geronimo Phase II: Geronimo Score: 10      Anesthesia Post Evaluation    Patient location during evaluation: bedside  Patient participation: complete - patient participated  Level of consciousness: awake and alert  Pain score: 0  Nausea & Vomiting: no nausea  Cardiovascular status: hemodynamically stable  Respiratory status: room air Sent in pt informed

## 2023-06-10 NOTE — FLOWSHEET NOTE
"Chief Complaint  Follow-up -pancytopenia, anemia    Subjective        Diamond Tapia presents to [facility] HEMAT[facility]  History of Present Illness    No new health issues since last visit.   No new medications.   No new hospitalization.   Feels well.       Objective   Vital Signs:  /70   Pulse 86   Resp 18   Wt 66.2 kg (146 lb)   SpO2 98%   BMI 26.70 kg/m²   Estimated body mass index is 26.7 kg/m² as calculated from the following:    Height as of 11/24/21: 157.5 cm (62\").    Weight as of this encounter: 66.2 kg (146 lb).             Physical Exam  Vitals and nursing note reviewed.   Constitutional:       Appearance: Normal appearance.   Neurological:      General: No focal deficit present.      Mental Status: She is alert and oriented to person, place, and time. Mental status is at baseline.   Psychiatric:         Mood and Affect: Mood normal.         Behavior: Behavior normal.         Thought Content: Thought content normal.      Result Review :  The following data was reviewed by: Dawood Celaya MD on 06/09/2023:        Diamond Tapia reports a pain score of 0.  Given her pain assessment as noted, treatment options were discussed and the following options were decided upon as a follow-up plan to address the patient's pain: continuation of current treatment plan for pain.    Patient screened negative for depression based on a PHQ-9 score of 0 on 6/9/2023.     Advance Care Planning   ACP discussion was declined by the patient. Patient does not have an advance directive, declines further assistance.         Assessment and Plan   Diagnoses and all orders for this visit:    1. Anemia of chronic kidney failure, stage 4 (severe) (Primary)  -     CBC & Differential; Future  -     Ferritin; Future  -     Iron Profile; Future      Chronic, stable.  hemoglobin stable at 10.5.  Iron studies are normal.  Recommend continue monitoring.  Continue oral iron.  CBC, CMP, " [address] [facility] [address]     Emergency/Trauma Note    PATIENT NAME: Coleen Hwang    Shift date: 12.7.2021  Shift day: Tuesday   Shift # 2    Room # 19/19   Name: Coleen Hwang            Age: 80 y.o. Gender: female          Faith: No Christian on file   Place of Adventism: unknown    Trauma/Incident type: Adult Trauma Consult  Admit Date & Time: 12/7/2021  3:46 PM  TRAUMA NAME: None    ADVANCE DIRECTIVES IN CHART? No    NAME OF DECISION MAKER: None    RELATIONSHIP OF DECISION MAKER TO PATIENT: None    PATIENT/EVENT DESCRIPTION:  Coleen Hwang is a 80 y.o. female who arrived as a TRAUMA CONSULT. Pt to be admitted to 19/19. SPIRITUAL ASSESSMENT/INTERVENTION:  Patient appears to be calm and coping with granddaughter bedside. Granddaughter states that patient is a DNR but does not seem to be sure which type. Patient confirmed that she is a DNR as well but did not specify which type she was as well. Patient expressed some mild frustration over the code status process and how she has to show paperwork. Patient indicates that she has a POA but no documents are in the system at this time.  requested documents to be sent to us for her chart if possible. Ortho requested that patient be moved to a FULL CODE for surgery.  informed medical staff of the needed change.  provided space for patient and granddaughter to express feelings, thoughts, and concerns. Determined support to be available. PATIENT BELONGINGS:  No belongings noted    ANY BELONGINGS OF SIGNIFICANT VALUE NOTED:  None    REGISTRATION STAFF NOTIFIED? Yes      WHAT IS YOUR SPIRITUAL CARE PLAN FOR THIS PATIENT?:  Chaplains will remain available to offer spiritual and emotional support as needed.       Electronically signed by Danny Mancera on 12/7/2021 at 10:46 PM.  Memo Leigh  [phone]       12/07/21 0003   Encounter Summary ferritin, iron profile in 6 months.    2. Pancytopenia  -     CBC & Differential; Future    Chronic, stable.  Pancytopenia is likely secondary to cirrhosis of the liver.  No new infection bleeding.  Follows with GI.  Continue to monitor.  CBC, CMP in 6 months.           Follow Up   No follow-ups on file.  Patient was given instructions and counseling regarding her condition or for health maintenance advice. Please see specific information pulled into the AVS if appropriate.

## (undated) DEVICE — GAUZE,SPONGE,FLUFF,6"X6.75",STRL,5/TRAY: Brand: MEDLINE

## (undated) DEVICE — DRAPE,U/ SHT,SPLIT,PLAS,STERIL: Brand: MEDLINE

## (undated) DEVICE — SVMMC ORTH SPL DRP PK

## (undated) DEVICE — GLOVE ORANGE PI 8   MSG9080

## (undated) DEVICE — SOLUTION IRRIG 3000ML 0.9% SOD CHL USP UROMATIC PLAS CONT

## (undated) DEVICE — SUTURE VCRL SZ 1 L36IN ABSRB UD L36MM CT-1 1/2 CIR J947H

## (undated) DEVICE — NEPTUNE E-SEP 165MM SUCTION SLEEVE: Brand: NEPTUNE E-SEP

## (undated) DEVICE — GLOVE ORANGE PI 7 1/2   MSG9075

## (undated) DEVICE — SUTURE MCRYL SZ 3-0 L27IN ABSRB UD L24MM PS-1 3/8 CIR PRIM Y936H

## (undated) DEVICE — STEINMANN PIN TROCAR POINT BOTH                                    ENDS 1/8 X 9
Type: IMPLANTABLE DEVICE | Site: FEMUR | Status: NON-FUNCTIONAL
Removed: 2021-12-08

## (undated) DEVICE — CYSTO/BLADDER IRRIGATION SET, REGULATING CLAMP

## (undated) DEVICE — TOTAL TRAY, 16FR 10ML SIL FOLEY, URN: Brand: MEDLINE

## (undated) DEVICE — MULTIPLE BAND LIGATOR: Brand: SPEEDBAND SUPERVIEW SUPER 7

## (undated) DEVICE — ADHESIVE SKIN CLOSURE TOP 36 CC HI VISC DERMBND MINI

## (undated) DEVICE — STRYKER PERFORMANCE SERIES SAGITTAL BLADE: Brand: STRYKER PERFORMANCE SERIES

## (undated) DEVICE — GOWN,SIRUS,NONRNF,SETINSLV,XL,20/CS: Brand: MEDLINE

## (undated) DEVICE — Z DISCONTINUED USE 2272124 DRAPE SURG XL N INVASIVE 2 LAYR DISP

## (undated) DEVICE — BLADE ES L6IN ELASTOMERIC COAT EXT DURABLE BEND UPTO 90DEG

## (undated) DEVICE — SHOULDER STABILIZATION KIT,                                    DISPOSABLE 12 PER BOX

## (undated) DEVICE — ZIPPERED TOGA, X-LARGE: Brand: FLYTE

## (undated) DEVICE — TUBING AMB

## (undated) DEVICE — C-ARM: Brand: UNBRANDED

## (undated) DEVICE — 3M™ IOBAN™ 2 ANTIMICROBIAL INCISE DRAPE 6651EZ: Brand: IOBAN™ 2

## (undated) DEVICE — SUTURE VCRL SZ 0 L18IN ABSRB UD L36MM CT-1 1/2 CIR J840D

## (undated) DEVICE — DRESSING FOAM W4XL10IN AG SIL ADH ANTIMIC POSTOP OPTIFOAM

## (undated) DEVICE — GLOVE ORANGE PI 8 1/2   MSG9085

## (undated) DEVICE — GLOVE ORTHO 8   MSG9480

## (undated) DEVICE — 6619 2 PTNT ISO SYS INCISE AREA&LT;(&GT;&&LT;)&GT;P: Brand: STERI-DRAPE™ IOBAN™ 2

## (undated) DEVICE — ZIPPERED TOGA, 2X LARGE: Brand: FLYTE

## (undated) DEVICE — BANDAGE COBAN 6 IN WND 6INX5YD FOAM

## (undated) DEVICE — SUTURE VCRL SZ 2-0 L18IN ABSRB UD CT-1 L36MM 1/2 CIR J839D